# Patient Record
Sex: FEMALE | Race: WHITE | Employment: OTHER | URBAN - METROPOLITAN AREA
[De-identification: names, ages, dates, MRNs, and addresses within clinical notes are randomized per-mention and may not be internally consistent; named-entity substitution may affect disease eponyms.]

---

## 2018-02-26 PROBLEM — E11.9 TYPE 2 DIABETES MELLITUS WITHOUT COMPLICATION, WITHOUT LONG-TERM CURRENT USE OF INSULIN (HCC): Status: ACTIVE | Noted: 2018-02-26

## 2018-06-06 ENCOUNTER — OFFICE VISIT (OUTPATIENT)
Dept: FAMILY MEDICINE CLINIC | Age: 63
End: 2018-06-06
Payer: COMMERCIAL

## 2018-06-06 VITALS
BODY MASS INDEX: 46.19 KG/M2 | WEIGHT: 251 LBS | TEMPERATURE: 97.1 F | SYSTOLIC BLOOD PRESSURE: 138 MMHG | HEIGHT: 62 IN | HEART RATE: 91 BPM | OXYGEN SATURATION: 96 % | DIASTOLIC BLOOD PRESSURE: 80 MMHG | RESPIRATION RATE: 18 BRPM

## 2018-06-06 DIAGNOSIS — I10 ESSENTIAL HYPERTENSION: ICD-10-CM

## 2018-06-06 DIAGNOSIS — Z23 NEED FOR VACCINATION: ICD-10-CM

## 2018-06-06 DIAGNOSIS — E07.9 THYROID DISEASE: ICD-10-CM

## 2018-06-06 DIAGNOSIS — E11.9 TYPE 2 DIABETES MELLITUS WITHOUT COMPLICATION, WITHOUT LONG-TERM CURRENT USE OF INSULIN (HCC): Primary | ICD-10-CM

## 2018-06-06 DIAGNOSIS — M19.90 ARTHRITIS: ICD-10-CM

## 2018-06-06 LAB — HBA1C MFR BLD: 6.6 %

## 2018-06-06 PROCEDURE — 3044F HG A1C LEVEL LT 7.0%: CPT | Performed by: FAMILY MEDICINE

## 2018-06-06 PROCEDURE — 99213 OFFICE O/P EST LOW 20 MIN: CPT | Performed by: FAMILY MEDICINE

## 2018-06-06 PROCEDURE — G8417 CALC BMI ABV UP PARAM F/U: HCPCS | Performed by: FAMILY MEDICINE

## 2018-06-06 PROCEDURE — 83036 HEMOGLOBIN GLYCOSYLATED A1C: CPT | Performed by: FAMILY MEDICINE

## 2018-06-06 PROCEDURE — 90670 PCV13 VACCINE IM: CPT | Performed by: FAMILY MEDICINE

## 2018-06-06 PROCEDURE — 90715 TDAP VACCINE 7 YRS/> IM: CPT | Performed by: FAMILY MEDICINE

## 2018-06-06 PROCEDURE — G8427 DOCREV CUR MEDS BY ELIG CLIN: HCPCS | Performed by: FAMILY MEDICINE

## 2018-06-06 PROCEDURE — 2022F DILAT RTA XM EVC RTNOPTHY: CPT | Performed by: FAMILY MEDICINE

## 2018-06-06 PROCEDURE — G8400 PT W/DXA NO RESULTS DOC: HCPCS | Performed by: FAMILY MEDICINE

## 2018-06-06 PROCEDURE — 90472 IMMUNIZATION ADMIN EACH ADD: CPT | Performed by: FAMILY MEDICINE

## 2018-06-06 PROCEDURE — 1036F TOBACCO NON-USER: CPT | Performed by: FAMILY MEDICINE

## 2018-06-06 PROCEDURE — 1090F PRES/ABSN URINE INCON ASSESS: CPT | Performed by: FAMILY MEDICINE

## 2018-06-06 PROCEDURE — 1123F ACP DISCUSS/DSCN MKR DOCD: CPT | Performed by: FAMILY MEDICINE

## 2018-06-06 PROCEDURE — 90471 IMMUNIZATION ADMIN: CPT | Performed by: FAMILY MEDICINE

## 2018-06-06 PROCEDURE — 3017F COLORECTAL CA SCREEN DOC REV: CPT | Performed by: FAMILY MEDICINE

## 2018-06-06 PROCEDURE — 4040F PNEUMOC VAC/ADMIN/RCVD: CPT | Performed by: FAMILY MEDICINE

## 2018-06-06 PROCEDURE — 3014F SCREEN MAMMO DOC REV: CPT | Performed by: FAMILY MEDICINE

## 2018-06-27 ENCOUNTER — OFFICE VISIT (OUTPATIENT)
Dept: FAMILY MEDICINE CLINIC | Age: 63
End: 2018-06-27
Payer: COMMERCIAL

## 2018-06-27 VITALS
BODY MASS INDEX: 46.38 KG/M2 | DIASTOLIC BLOOD PRESSURE: 70 MMHG | HEART RATE: 96 BPM | TEMPERATURE: 98.5 F | RESPIRATION RATE: 16 BRPM | SYSTOLIC BLOOD PRESSURE: 140 MMHG | WEIGHT: 253.6 LBS | OXYGEN SATURATION: 95 %

## 2018-06-27 DIAGNOSIS — E11.9 TYPE 2 DIABETES MELLITUS WITHOUT COMPLICATION, WITHOUT LONG-TERM CURRENT USE OF INSULIN (HCC): Primary | ICD-10-CM

## 2018-06-27 DIAGNOSIS — H92.02 LEFT EAR PAIN: ICD-10-CM

## 2018-06-27 DIAGNOSIS — I10 ESSENTIAL HYPERTENSION: ICD-10-CM

## 2018-06-27 LAB
CREATININE URINE POCT: NORMAL
MICROALBUMIN/CREAT 24H UR: NORMAL MG/G{CREAT}
MICROALBUMIN/CREAT UR-RTO: NORMAL

## 2018-06-27 PROCEDURE — 1036F TOBACCO NON-USER: CPT | Performed by: FAMILY MEDICINE

## 2018-06-27 PROCEDURE — 4040F PNEUMOC VAC/ADMIN/RCVD: CPT | Performed by: FAMILY MEDICINE

## 2018-06-27 PROCEDURE — 3014F SCREEN MAMMO DOC REV: CPT | Performed by: FAMILY MEDICINE

## 2018-06-27 PROCEDURE — G8400 PT W/DXA NO RESULTS DOC: HCPCS | Performed by: FAMILY MEDICINE

## 2018-06-27 PROCEDURE — 1090F PRES/ABSN URINE INCON ASSESS: CPT | Performed by: FAMILY MEDICINE

## 2018-06-27 PROCEDURE — 2022F DILAT RTA XM EVC RTNOPTHY: CPT | Performed by: FAMILY MEDICINE

## 2018-06-27 PROCEDURE — 3044F HG A1C LEVEL LT 7.0%: CPT | Performed by: FAMILY MEDICINE

## 2018-06-27 PROCEDURE — 99213 OFFICE O/P EST LOW 20 MIN: CPT | Performed by: FAMILY MEDICINE

## 2018-06-27 PROCEDURE — G8417 CALC BMI ABV UP PARAM F/U: HCPCS | Performed by: FAMILY MEDICINE

## 2018-06-27 PROCEDURE — G8427 DOCREV CUR MEDS BY ELIG CLIN: HCPCS | Performed by: FAMILY MEDICINE

## 2018-06-27 PROCEDURE — 82044 UR ALBUMIN SEMIQUANTITATIVE: CPT | Performed by: FAMILY MEDICINE

## 2018-06-27 PROCEDURE — 3017F COLORECTAL CA SCREEN DOC REV: CPT | Performed by: FAMILY MEDICINE

## 2018-06-27 PROCEDURE — 1123F ACP DISCUSS/DSCN MKR DOCD: CPT | Performed by: FAMILY MEDICINE

## 2018-07-25 ENCOUNTER — TELEPHONE (OUTPATIENT)
Dept: FAMILY MEDICINE CLINIC | Age: 63
End: 2018-07-25

## 2018-07-25 DIAGNOSIS — E07.9 THYROID DISEASE: ICD-10-CM

## 2018-07-25 RX ORDER — LEVOTHYROXINE SODIUM 50 MCG
50 TABLET ORAL DAILY
Qty: 90 TABLET | Refills: 0 | Status: SHIPPED | OUTPATIENT
Start: 2018-07-25 | End: 2018-10-31 | Stop reason: SDUPTHER

## 2018-07-27 RX ORDER — IRBESARTAN AND HYDROCHLOROTHIAZIDE 150; 12.5 MG/1; MG/1
1 TABLET, FILM COATED ORAL DAILY
Qty: 90 TABLET | Refills: 1 | Status: SHIPPED | OUTPATIENT
Start: 2018-07-27 | End: 2019-01-15 | Stop reason: SDUPTHER

## 2018-10-31 DIAGNOSIS — E07.9 THYROID DISEASE: ICD-10-CM

## 2018-10-31 RX ORDER — LEVOTHYROXINE SODIUM 50 MCG
50 TABLET ORAL DAILY
Qty: 90 TABLET | Refills: 0 | Status: SHIPPED | OUTPATIENT
Start: 2018-10-31 | End: 2019-02-25 | Stop reason: SDUPTHER

## 2018-12-11 ENCOUNTER — OFFICE VISIT (OUTPATIENT)
Dept: FAMILY MEDICINE CLINIC | Age: 63
End: 2018-12-11
Payer: MEDICARE

## 2018-12-11 VITALS
TEMPERATURE: 97.7 F | HEIGHT: 62 IN | OXYGEN SATURATION: 97 % | BODY MASS INDEX: 47.48 KG/M2 | DIASTOLIC BLOOD PRESSURE: 74 MMHG | WEIGHT: 258 LBS | SYSTOLIC BLOOD PRESSURE: 138 MMHG | HEART RATE: 94 BPM | RESPIRATION RATE: 16 BRPM

## 2018-12-11 DIAGNOSIS — E07.9 THYROID DISEASE: ICD-10-CM

## 2018-12-11 DIAGNOSIS — Z23 NEED FOR PROPHYLACTIC VACCINATION AND INOCULATION AGAINST VARICELLA: ICD-10-CM

## 2018-12-11 DIAGNOSIS — Z00.00 WELCOME TO MEDICARE PREVENTIVE VISIT: Primary | ICD-10-CM

## 2018-12-11 DIAGNOSIS — I10 ESSENTIAL HYPERTENSION: ICD-10-CM

## 2018-12-11 DIAGNOSIS — E11.9 TYPE 2 DIABETES MELLITUS WITHOUT COMPLICATION, WITHOUT LONG-TERM CURRENT USE OF INSULIN (HCC): ICD-10-CM

## 2018-12-11 DIAGNOSIS — Z00.00 ROUTINE GENERAL MEDICAL EXAMINATION AT A HEALTH CARE FACILITY: ICD-10-CM

## 2018-12-11 DIAGNOSIS — R01.1 HEART MURMUR: ICD-10-CM

## 2018-12-11 DIAGNOSIS — E66.01 CLASS 3 SEVERE OBESITY DUE TO EXCESS CALORIES WITHOUT SERIOUS COMORBIDITY WITH BODY MASS INDEX (BMI) OF 45.0 TO 49.9 IN ADULT (HCC): ICD-10-CM

## 2018-12-11 LAB — HBA1C MFR BLD: 6.9 %

## 2018-12-11 PROCEDURE — 83036 HEMOGLOBIN GLYCOSYLATED A1C: CPT | Performed by: FAMILY MEDICINE

## 2018-12-11 PROCEDURE — G0402 INITIAL PREVENTIVE EXAM: HCPCS | Performed by: FAMILY MEDICINE

## 2018-12-11 PROCEDURE — 3044F HG A1C LEVEL LT 7.0%: CPT | Performed by: FAMILY MEDICINE

## 2018-12-11 PROCEDURE — 4040F PNEUMOC VAC/ADMIN/RCVD: CPT | Performed by: FAMILY MEDICINE

## 2018-12-11 PROCEDURE — G0403 EKG FOR INITIAL PREVENT EXAM: HCPCS | Performed by: FAMILY MEDICINE

## 2018-12-11 ASSESSMENT — ANXIETY QUESTIONNAIRES: GAD7 TOTAL SCORE: 0

## 2018-12-11 ASSESSMENT — PATIENT HEALTH QUESTIONNAIRE - PHQ9
SUM OF ALL RESPONSES TO PHQ QUESTIONS 1-9: 0
SUM OF ALL RESPONSES TO PHQ QUESTIONS 1-9: 0

## 2018-12-11 ASSESSMENT — LIFESTYLE VARIABLES: HOW OFTEN DO YOU HAVE A DRINK CONTAINING ALCOHOL: 0

## 2018-12-11 NOTE — PATIENT INSTRUCTIONS
Personalized Preventive Plan for Bakari Richter - 12/11/2018  Medicare offers a range of preventive health benefits. Some of the tests and screenings are paid in full while other may be subject to a deductible, co-insurance, and/or copay. Some of these benefits include a comprehensive review of your medical history including lifestyle, illnesses that may run in your family, and various assessments and screenings as appropriate. After reviewing your medical record and screening and assessments performed today your provider may have ordered immunizations, labs, imaging, and/or referrals for you. A list of these orders (if applicable) as well as your Preventive Care list are included within your After Visit Summary for your review. Other Preventive Recommendations:    · A preventive eye exam performed by an eye specialist is recommended every 1-2 years to screen for glaucoma; cataracts, macular degeneration, and other eye disorders. · A preventive dental visit is recommended every 6 months. · Try to get at least 150 minutes of exercise per week or 10,000 steps per day on a pedometer . · Order or download the FREE \"Exercise & Physical Activity: Your Everyday Guide\" from [employer]. Call 8-549.656.7980 or search [employer] online. · You need 2091-3968 mg of calcium and 9974-8873 IU of vitamin D per day. It is possible to meet your calcium requirement with diet alone, but a vitamin D supplement is usually necessary to meet this goal.  · When exposed to the sun, use a sunscreen that protects against both UVA and UVB radiation with an SPF of 30 or greater. Reapply every 2 to 3 hours or after sweating, drying off with a towel, or swimming. · Always wear a seat belt when traveling in a car. Always wear a helmet when riding a bicycle or motorcycle.

## 2018-12-25 PROBLEM — E66.813 CLASS 3 SEVERE OBESITY DUE TO EXCESS CALORIES WITHOUT SERIOUS COMORBIDITY WITH BODY MASS INDEX (BMI) OF 45.0 TO 49.9 IN ADULT: Status: ACTIVE | Noted: 2018-12-25

## 2018-12-25 PROBLEM — E66.01 CLASS 3 SEVERE OBESITY DUE TO EXCESS CALORIES WITHOUT SERIOUS COMORBIDITY WITH BODY MASS INDEX (BMI) OF 45.0 TO 49.9 IN ADULT (HCC): Status: ACTIVE | Noted: 2018-12-25

## 2019-01-04 ENCOUNTER — HOSPITAL ENCOUNTER (OUTPATIENT)
Dept: CARDIOLOGY | Age: 64
Discharge: HOME | End: 2019-01-04
Payer: MEDICARE

## 2019-01-04 DIAGNOSIS — R01.1 HEART MURMUR: ICD-10-CM

## 2019-01-04 LAB
LV EF: 65 %
LVEF MODALITY: NORMAL

## 2019-01-04 PROCEDURE — 93306 TTE W/DOPPLER COMPLETE: CPT

## 2019-01-04 PROCEDURE — 6360000004 HC RX CONTRAST MEDICATION: Performed by: INTERNAL MEDICINE

## 2019-01-04 PROCEDURE — 2580000003 HC RX 258: Performed by: INTERNAL MEDICINE

## 2019-01-04 RX ORDER — SODIUM CHLORIDE 0.9 % (FLUSH) 0.9 %
10 SYRINGE (ML) INJECTION PRN
Status: DISCONTINUED | OUTPATIENT
Start: 2019-01-04 | End: 2019-01-05 | Stop reason: HOSPADM

## 2019-01-04 RX ADMIN — PERFLUTREN 1.1 MG: 6.52 INJECTION, SUSPENSION INTRAVENOUS at 09:02

## 2019-01-04 RX ADMIN — Medication 10 ML: at 09:02

## 2019-01-15 RX ORDER — IRBESARTAN AND HYDROCHLOROTHIAZIDE 150; 12.5 MG/1; MG/1
1 TABLET, FILM COATED ORAL DAILY
Qty: 90 TABLET | Refills: 1 | Status: SHIPPED | OUTPATIENT
Start: 2019-01-15 | End: 2019-02-05 | Stop reason: ALTCHOICE

## 2019-02-04 ENCOUNTER — TELEPHONE (OUTPATIENT)
Dept: FAMILY MEDICINE CLINIC | Age: 64
End: 2019-02-04

## 2019-02-05 RX ORDER — OLMESARTAN MEDOXOMIL AND HYDROCHLOROTHIAZIDE 40/12.5 40; 12.5 MG/1; MG/1
1 TABLET ORAL DAILY
Qty: 90 TABLET | Refills: 1 | Status: SHIPPED | OUTPATIENT
Start: 2019-02-05 | End: 2019-05-17 | Stop reason: SDUPTHER

## 2019-02-06 ENCOUNTER — TELEPHONE (OUTPATIENT)
Dept: FAMILY MEDICINE CLINIC | Age: 64
End: 2019-02-06

## 2019-02-06 DIAGNOSIS — I35.0 MODERATE AORTIC STENOSIS: Primary | ICD-10-CM

## 2019-02-22 ENCOUNTER — TELEPHONE (OUTPATIENT)
Dept: ADMINISTRATIVE | Age: 64
End: 2019-02-22

## 2019-02-25 DIAGNOSIS — E07.9 THYROID DISEASE: ICD-10-CM

## 2019-02-25 RX ORDER — LEVOTHYROXINE SODIUM 50 MCG
50 TABLET ORAL DAILY
Qty: 90 TABLET | Refills: 1 | Status: SHIPPED | OUTPATIENT
Start: 2019-02-25 | End: 2019-05-17 | Stop reason: SDUPTHER

## 2019-02-27 ENCOUNTER — TELEPHONE (OUTPATIENT)
Dept: CARDIOLOGY CLINIC | Age: 64
End: 2019-02-27

## 2019-04-04 ENCOUNTER — TELEPHONE (OUTPATIENT)
Dept: FAMILY MEDICINE CLINIC | Age: 64
End: 2019-04-04

## 2019-04-04 NOTE — TELEPHONE ENCOUNTER
L ankle swollen on inside, warm to touch x 2 days. Doesn't think she did anything.  Gave her appt tomorrow with Delphine Hauser

## 2019-04-05 ENCOUNTER — OFFICE VISIT (OUTPATIENT)
Dept: FAMILY MEDICINE CLINIC | Age: 64
End: 2019-04-05
Payer: MEDICARE

## 2019-04-05 VITALS
WEIGHT: 246.8 LBS | TEMPERATURE: 97.3 F | DIASTOLIC BLOOD PRESSURE: 70 MMHG | SYSTOLIC BLOOD PRESSURE: 138 MMHG | HEART RATE: 94 BPM | OXYGEN SATURATION: 96 % | BODY MASS INDEX: 45.14 KG/M2 | RESPIRATION RATE: 16 BRPM

## 2019-04-05 DIAGNOSIS — E11.9 TYPE 2 DIABETES MELLITUS WITHOUT COMPLICATION, WITHOUT LONG-TERM CURRENT USE OF INSULIN (HCC): ICD-10-CM

## 2019-04-05 DIAGNOSIS — M25.572 ACUTE LEFT ANKLE PAIN: Primary | ICD-10-CM

## 2019-04-05 LAB — HBA1C MFR BLD: 6.5 %

## 2019-04-05 PROCEDURE — G8417 CALC BMI ABV UP PARAM F/U: HCPCS | Performed by: PHYSICIAN ASSISTANT

## 2019-04-05 PROCEDURE — 4040F PNEUMOC VAC/ADMIN/RCVD: CPT | Performed by: PHYSICIAN ASSISTANT

## 2019-04-05 PROCEDURE — 99213 OFFICE O/P EST LOW 20 MIN: CPT | Performed by: PHYSICIAN ASSISTANT

## 2019-04-05 PROCEDURE — 1090F PRES/ABSN URINE INCON ASSESS: CPT | Performed by: PHYSICIAN ASSISTANT

## 2019-04-05 PROCEDURE — 2022F DILAT RTA XM EVC RTNOPTHY: CPT | Performed by: PHYSICIAN ASSISTANT

## 2019-04-05 PROCEDURE — 83036 HEMOGLOBIN GLYCOSYLATED A1C: CPT | Performed by: PHYSICIAN ASSISTANT

## 2019-04-05 PROCEDURE — 1123F ACP DISCUSS/DSCN MKR DOCD: CPT | Performed by: PHYSICIAN ASSISTANT

## 2019-04-05 PROCEDURE — 1036F TOBACCO NON-USER: CPT | Performed by: PHYSICIAN ASSISTANT

## 2019-04-05 PROCEDURE — 3044F HG A1C LEVEL LT 7.0%: CPT | Performed by: PHYSICIAN ASSISTANT

## 2019-04-05 PROCEDURE — 3017F COLORECTAL CA SCREEN DOC REV: CPT | Performed by: PHYSICIAN ASSISTANT

## 2019-04-05 PROCEDURE — G8427 DOCREV CUR MEDS BY ELIG CLIN: HCPCS | Performed by: PHYSICIAN ASSISTANT

## 2019-04-05 PROCEDURE — G8400 PT W/DXA NO RESULTS DOC: HCPCS | Performed by: PHYSICIAN ASSISTANT

## 2019-04-05 ASSESSMENT — PATIENT HEALTH QUESTIONNAIRE - PHQ9
SUM OF ALL RESPONSES TO PHQ QUESTIONS 1-9: 0
1. LITTLE INTEREST OR PLEASURE IN DOING THINGS: 0
2. FEELING DOWN, DEPRESSED OR HOPELESS: 0
SUM OF ALL RESPONSES TO PHQ QUESTIONS 1-9: 0
SUM OF ALL RESPONSES TO PHQ9 QUESTIONS 1 & 2: 0

## 2019-04-05 NOTE — PROGRESS NOTES
Chief Complaint:   Ankle Problem (red, swollen, and painful when walking x yesterday-Better today)    History of Present Illness   Source of history provided by:  patient. Randy Fry is a 75 y.o. old female who has a past medical history of:   Past Medical History:   Diagnosis Date    Hypertension     Obesity     Thyroid disease    presents for complaint of redness to the left ankle yesterday. She states that the ankle was slight red and swollen and was painful when she was walking. She states that she elevated it a lot yesterday and today symptoms have resolved. She has been doing a lot of exercising at the gym and doing water aerobics. Denies any redness, swelling, or pain today. Denies leg pain, calf pain, or calf redness. Denies any fever, chills, HA, recent illness, myalgias, vomiting, or lethargy. Patient is requesting A1c checked today due to recent changes in diet and exercise. Last A1c was 6.9. Today it was 6.5.     ROS    Unless otherwise stated in this report or unable to obtain because of the patient's clinical or mental status as evidenced by the medical record, this patients's positive and negative responses for Review of Systems, constitutional, psych, eyes, ENT, cardiovascular, respiratory, gastrointestinal, neurological, genitourinary, musculoskeletal, integument systems and systems related to the presenting problem are either stated in the preceding or were not pertinent or were negative for the symptoms and/or complaints related to the medical problem. Past Surgical History:  has a past surgical history that includes  section; Colonoscopy (); Coronary artery bypass graft; and Spine surgery. Social History:  reports that she has never smoked. She has never used smokeless tobacco. She reports that she does not drink alcohol or use drugs.   Family History: family history includes Cancer in her father; Diabetes in her mother; Early Death (age of onset: 58) in her

## 2019-04-12 ENCOUNTER — TELEPHONE (OUTPATIENT)
Dept: FAMILY MEDICINE CLINIC | Age: 64
End: 2019-04-12

## 2019-04-12 NOTE — TELEPHONE ENCOUNTER
Received fax from [facility]-Irbesartan/HCT is on back order and need to switch medication.   This was already addressed and switched on 02/05/19

## 2019-04-22 NOTE — TELEPHONE ENCOUNTER
Refill Irbesartan /12.5 mg, [facility] 90 day supply    Spoke with Patient and she is going to switch to the Olmesartan HCTZ and will take her blood pressures and call with readings once she starts taking the new medication. DLB agrees with treatment plan.

## 2019-05-07 ENCOUNTER — TELEPHONE (OUTPATIENT)
Dept: FAMILY MEDICINE CLINIC | Age: 64
End: 2019-05-07

## 2019-05-07 DIAGNOSIS — M25.572 CHRONIC PAIN OF LEFT ANKLE: Primary | ICD-10-CM

## 2019-05-07 DIAGNOSIS — G89.29 CHRONIC PAIN OF LEFT ANKLE: Primary | ICD-10-CM

## 2019-05-07 NOTE — TELEPHONE ENCOUNTER
Pt called and wanted to know if you can order an Xray for her Left ankle. Pt is still having some pain and swelling off an on x 1 yr. Pt is coming in on 5-17-19. Fina's ph# [phone].

## 2019-05-17 ENCOUNTER — OFFICE VISIT (OUTPATIENT)
Dept: FAMILY MEDICINE CLINIC | Age: 64
End: 2019-05-17
Payer: MEDICARE

## 2019-05-17 VITALS
WEIGHT: 245.2 LBS | OXYGEN SATURATION: 97 % | RESPIRATION RATE: 16 BRPM | DIASTOLIC BLOOD PRESSURE: 60 MMHG | SYSTOLIC BLOOD PRESSURE: 130 MMHG | HEART RATE: 105 BPM | TEMPERATURE: 97.9 F | BODY MASS INDEX: 44.85 KG/M2

## 2019-05-17 DIAGNOSIS — E66.01 CLASS 3 SEVERE OBESITY DUE TO EXCESS CALORIES WITHOUT SERIOUS COMORBIDITY WITH BODY MASS INDEX (BMI) OF 45.0 TO 49.9 IN ADULT (HCC): ICD-10-CM

## 2019-05-17 DIAGNOSIS — I10 ESSENTIAL HYPERTENSION: Primary | ICD-10-CM

## 2019-05-17 DIAGNOSIS — E11.9 TYPE 2 DIABETES MELLITUS WITHOUT COMPLICATION, WITHOUT LONG-TERM CURRENT USE OF INSULIN (HCC): ICD-10-CM

## 2019-05-17 DIAGNOSIS — E07.9 THYROID DISEASE: ICD-10-CM

## 2019-05-17 DIAGNOSIS — M25.572 CHRONIC PAIN OF LEFT ANKLE: ICD-10-CM

## 2019-05-17 DIAGNOSIS — M25.572 ACUTE LEFT ANKLE PAIN: ICD-10-CM

## 2019-05-17 DIAGNOSIS — G89.29 CHRONIC PAIN OF LEFT ANKLE: ICD-10-CM

## 2019-05-17 PROCEDURE — 99214 OFFICE O/P EST MOD 30 MIN: CPT | Performed by: FAMILY MEDICINE

## 2019-05-17 PROCEDURE — G8400 PT W/DXA NO RESULTS DOC: HCPCS | Performed by: FAMILY MEDICINE

## 2019-05-17 PROCEDURE — 1123F ACP DISCUSS/DSCN MKR DOCD: CPT | Performed by: FAMILY MEDICINE

## 2019-05-17 PROCEDURE — 1036F TOBACCO NON-USER: CPT | Performed by: FAMILY MEDICINE

## 2019-05-17 PROCEDURE — 1090F PRES/ABSN URINE INCON ASSESS: CPT | Performed by: FAMILY MEDICINE

## 2019-05-17 PROCEDURE — 3044F HG A1C LEVEL LT 7.0%: CPT | Performed by: FAMILY MEDICINE

## 2019-05-17 PROCEDURE — 4040F PNEUMOC VAC/ADMIN/RCVD: CPT | Performed by: FAMILY MEDICINE

## 2019-05-17 PROCEDURE — 2022F DILAT RTA XM EVC RTNOPTHY: CPT | Performed by: FAMILY MEDICINE

## 2019-05-17 PROCEDURE — G8417 CALC BMI ABV UP PARAM F/U: HCPCS | Performed by: FAMILY MEDICINE

## 2019-05-17 PROCEDURE — G8427 DOCREV CUR MEDS BY ELIG CLIN: HCPCS | Performed by: FAMILY MEDICINE

## 2019-05-17 PROCEDURE — 3017F COLORECTAL CA SCREEN DOC REV: CPT | Performed by: FAMILY MEDICINE

## 2019-05-17 RX ORDER — OLMESARTAN MEDOXOMIL AND HYDROCHLOROTHIAZIDE 40/12.5 40; 12.5 MG/1; MG/1
1 TABLET ORAL DAILY
Qty: 90 TABLET | Refills: 1 | Status: SHIPPED | OUTPATIENT
Start: 2019-05-17 | End: 2019-08-14 | Stop reason: RX

## 2019-05-17 RX ORDER — LEVOTHYROXINE SODIUM 50 MCG
50 TABLET ORAL DAILY
Qty: 90 TABLET | Refills: 1 | Status: SHIPPED | OUTPATIENT
Start: 2019-05-17 | End: 2019-11-18 | Stop reason: SDUPTHER

## 2019-05-17 NOTE — PROGRESS NOTES
5/18/2019    Chief Complaint   Patient presents with    Ankle Pain     Swells off and on    Results              Patient Active Problem List    Diagnosis Date Noted    Acute left ankle pain 04/05/2019    Type 2 diabetes mellitus without complication, without long-term current use of insulin ([address] 02/26/2018    HTN (hypertension) 02/09/2015    Class 3 severe obesity due to excess calories without serious comorbidity with body mass index (BMI) of 45.0 to 49.9 in [facility]) 02/09/2015    Thyroid disease 02/09/2015     Subjective  Has appt next week and would like to address all issues today  Ankle xray reviewed and spurr on calcaneous and hindfoot  Painful when walking    1. Thyroid disease  Lab Results   Component Value Date    TSH 3.130 02/16/2018   no labs in over 1 year but states she will get    - SYNTHROID 50 MCG tablet; Take 1 tablet by mouth Daily  Dispense: 90 tablet; Refill: 1  - CBC Auto Differential; Future  - Lipid Panel; Future  - TSH without Reflex; Future    2. Essential hypertension  Vitals:    05/17/19 1609   BP: 130/60   Pulse:    Resp:    Temp:    SpO2:        - olmesartan-hydrochlorothiazide (BENICAR HCT) 40-12.5 MG per tablet; Take 1 tablet by mouth daily  Dispense: 90 tablet; Refill: 1  - Comprehensive Metabolic Panel; Future  - Lipid Panel; Future  - TSH without Reflex; Future    3. Acute left ankle pain    - Margoth Alamo MD, Orthopaedics, [place] ([facility])    4. Class 3 severe obesity due to excess calories without serious comorbidity with body mass index (BMI) of 45.0 to 49.9 in [facility])  Watching diet and going to join gym and see ortho    5. Type 2 diabetes mellitus without complication, without long-term current use of insulin ([address]  .         Goals      Blood Pressure < 140/90      Exercise 3x per week (30 min per time)      Reduce sugar intake to X grams per day         Goals      Review of Systems     Review Of Systems    General:  No weight change no malaise no fatigue no change in appetite no sleep disturbance nofever/chills no night sweats  Skin:                 no abnormal pigmentation, no rash, no scaling,noitching,no Masses,no  hair or nail changes  Eyes:               no blurring, no diplopia, no  eye pain noglaucoma no cataracts  ENT:                 no hearing loss,no  Tinnitus,no  Vertigo,no osebleed, no nasalcongestion, no rhinorrhea,  no sore throat no jaw pain no hoarseness no bleeding    Neck:    no node tenderness , not rigid, no masses   Respiratory:            no cough, no sputum,No coughing blood, no pleuritic , no chest pain, no dyspnea,  no wheezing  Cardiovascular:         no angina,No chest pain  No syncope, no pedal edema , no orthopnea, no PND, no palpitations, no claudication  Gastrointestinal  no nausea, no vomiting, no heartburn, no diarrhea, no constipation, no bloating,  no abdominal pain, no rectal pain, no bleeding no hemorrhoids, no hernia  Genitourinary:           no urinary urgency, no frequency, no dysuria, no nocturia, hesitancy, no  Incontinence, no bleeding, no stones  Musculoskeletal:        ++ arthritis,++ L foot pain  arthralgia, no myalgia,no  weakness,  no morning stiffness, no joint swelling  Neurologic:                no paralysis, no resis,no  paresthesia, no seizures,no  tremors,no headaches, no tumors , no stroke, no speechissues,  No incoordination, no head trauma, no memory loss/concentration  Hematologic:            no anemia, noabnormal bleeding/bruising, no fever, no chills,no night sweats, no wollen glands, no unexplained weight loss  Endocrine:        no heat or cold intolerance and no polyphagia, polydipsia,  or polyuria  Psych:   No depression no anxiety, no suicidal or homicidal thoughts, no irritability, no decreased energy, no trouble falling asleep, no early awakening , no trouble concentrating             Objective:    Family History   Problem Relation Age of Onset    Early Death Mother 58    Diabetes Mother     Heart visit. Wt Readings from Last 3 Encounters:   05/17/19 245 lb 3.2 oz (111.2 kg)   04/05/19 246 lb 12.8 oz (111.9 kg)   12/11/18 258 lb (117 kg)         Vitals:    05/17/19 1609   BP: 130/60   Pulse:    Resp:    Temp:    SpO2:          Physical Exam    Physical Exam:    General: Noobesity, no cachexia  Skin:    Warm and dry. Not pale, not flushed , no lesions, No rash , no bruises  HEENT:   PERRLA, EOMI. Conjunctiva clear, no  Drainage, no jaundice, ext canals normal ,no cerumenimpaction,TM's christina, throat no injected, no cobblestoning, no drainage , tonsils normal no exudates  Neck:    Supple,No JVD, No thyromegaly, No carotid bruit, no adenopathy  Cardiac:   PMI Normal,RRR, No gallop , no  murmur. No S3, no S4, no abd aortic bruit, normal pulses, , no pedal edema  Lungs:   CTA, symmetrical,  No wheezes no rales, Normal percussion, no use of accessory muscles,   Abdomen:  Normal bowel sounds, abd soft, non-tender, no rebound no guarding,no hepatomegaly, no splenomegaly, no hernia, no ascites, normal palpation  Extremities:   No clubbing, no edema no cyanosis. Pedal pulses ok  M/S:  Back normal,no kyphosis, no cva tenderness,  Head and neck normal rom, shoulders normal strength , , hips normal , gait normal , no cane or walker , no motor deficits, no clubbing, normal nails  Neurological: A & O x 3, Moves all extremities,  No gross neuro deficits ,normal DTR, normal memory, normal judgement/insight, normal affect                                 Assessment:    1. Thyroid disease  Stable, cont meds recheck 6 mos    - SYNTHROID 50 MCG tablet; Take 1 tablet by mouth Daily  Dispense: 90 tablet; Refill: 1  - CBC Auto Differential; Future  - Lipid Panel; Future  - TSH without Reflex; Future    2. Essential hypertension  Stable, cont meds recheck 6 mos    - olmesartan-hydrochlorothiazide (BENICAR HCT) 40-12.5 MG per tablet; Take 1 tablet by mouth daily  Dispense: 90 tablet;  Refill: 1  - Comprehensive Metabolic Panel; Future  - Lipid Panel; Future  - TSH without Reflex; Future    3. Acute left ankle pain  Stable, cont meds recheck 6 mos    - Jorge Cisneros MD, Orthopaedics, [place] [facility]    4. Class 3 severe obesity due to excess calories without serious comorbidity with body mass index (BMI) of 45.0 to 49.9 in adult (HCC)  Stable, cont meds recheck 6 mos      5. Type 2 diabetes mellitus without complication, without long-term current use of insulin ([facility])  Stable, cont meds recheck 6 mos        Quality & Risk Score Accuracy    Visit Dx:  E66.01, Z68.41 - Morbid obesity with BMI of 40.0-44.9, adult ([facility] [address]  The current BMI is 44.85 kg/m2 as of 05/18/19 07:03 EDT  Assessment and plan: Worsening based upon symptoms and exam. Treatment plan as follows: going to weight watchers and going to  Gym now  Follow up in 3 months. Last edited 05/18/19 07:04 EDT by Mina Monsivais MD                     4. Reviewed labs    5. No follow-ups on file.            Mina Monsivais M.D.    5/18/2019

## 2019-08-12 ENCOUNTER — TELEPHONE (OUTPATIENT)
Dept: FAMILY MEDICINE CLINIC | Age: 64
End: 2019-08-12

## 2019-08-14 RX ORDER — IRBESARTAN AND HYDROCHLOROTHIAZIDE 150; 12.5 MG/1; MG/1
1 TABLET, FILM COATED ORAL DAILY
Qty: 90 TABLET | Refills: 1 | Status: SHIPPED
Start: 2019-08-14 | End: 2020-02-11 | Stop reason: SDUPTHER

## 2019-11-18 DIAGNOSIS — E07.9 THYROID DISEASE: ICD-10-CM

## 2019-11-18 RX ORDER — LEVOTHYROXINE SODIUM 50 MCG
50 TABLET ORAL DAILY
Qty: 90 TABLET | Refills: 1 | Status: SHIPPED | OUTPATIENT
Start: 2019-11-18 | End: 2019-11-20 | Stop reason: SDUPTHER

## 2019-11-20 DIAGNOSIS — E07.9 THYROID DISEASE: ICD-10-CM

## 2019-11-20 RX ORDER — LEVOTHYROXINE SODIUM 50 MCG
50 TABLET ORAL DAILY
Qty: 90 TABLET | Refills: 1 | Status: SHIPPED
Start: 2019-11-20 | End: 2020-02-11 | Stop reason: SDUPTHER

## 2020-02-11 RX ORDER — LEVOTHYROXINE SODIUM 50 MCG
50 TABLET ORAL DAILY
Qty: 90 TABLET | Refills: 1 | Status: SHIPPED
Start: 2020-02-11 | End: 2020-08-05 | Stop reason: SDUPTHER

## 2020-02-11 RX ORDER — IRBESARTAN AND HYDROCHLOROTHIAZIDE 150; 12.5 MG/1; MG/1
1 TABLET, FILM COATED ORAL DAILY
Qty: 90 TABLET | Refills: 1 | Status: SHIPPED
Start: 2020-02-11 | End: 2020-08-05 | Stop reason: SDUPTHER

## 2020-07-28 RX ORDER — IRBESARTAN AND HYDROCHLOROTHIAZIDE 150; 12.5 MG/1; MG/1
TABLET, FILM COATED ORAL
Qty: 90 TABLET | Refills: 1 | OUTPATIENT
Start: 2020-07-28

## 2020-07-28 RX ORDER — LEVOTHYROXINE SODIUM 50 MCG
TABLET ORAL
Qty: 90 TABLET | Refills: 1 | OUTPATIENT
Start: 2020-07-28

## 2020-08-05 RX ORDER — IRBESARTAN AND HYDROCHLOROTHIAZIDE 150; 12.5 MG/1; MG/1
1 TABLET, FILM COATED ORAL DAILY
Qty: 90 TABLET | Refills: 0 | Status: SHIPPED
Start: 2020-08-05 | End: 2020-11-09 | Stop reason: SDUPTHER

## 2020-08-05 RX ORDER — LEVOTHYROXINE SODIUM 50 MCG
50 TABLET ORAL DAILY
Qty: 90 TABLET | Refills: 0 | Status: SHIPPED
Start: 2020-08-05 | End: 2020-11-17 | Stop reason: SDUPTHER

## 2020-11-09 RX ORDER — IRBESARTAN AND HYDROCHLOROTHIAZIDE 150; 12.5 MG/1; MG/1
1 TABLET, FILM COATED ORAL DAILY
Qty: 90 TABLET | Refills: 0 | Status: SHIPPED
Start: 2020-11-09 | End: 2021-02-05 | Stop reason: SDUPTHER

## 2020-11-17 RX ORDER — LEVOTHYROXINE SODIUM 50 MCG
50 TABLET ORAL DAILY
Qty: 90 TABLET | Refills: 0 | Status: SHIPPED
Start: 2020-11-17 | End: 2021-02-05 | Stop reason: SDUPTHER

## 2020-12-07 ENCOUNTER — OFFICE VISIT (OUTPATIENT)
Dept: FAMILY MEDICINE CLINIC | Age: 65
End: 2020-12-07
Payer: MEDICARE

## 2020-12-07 DIAGNOSIS — E11.9 TYPE 2 DIABETES MELLITUS WITHOUT COMPLICATION, WITHOUT LONG-TERM CURRENT USE OF INSULIN (HCC): ICD-10-CM

## 2020-12-07 DIAGNOSIS — E07.9 THYROID DISEASE: ICD-10-CM

## 2020-12-07 DIAGNOSIS — I10 ESSENTIAL HYPERTENSION: ICD-10-CM

## 2020-12-07 PROBLEM — I35.0 AORTIC STENOSIS, MODERATE: Status: ACTIVE | Noted: 2020-12-07

## 2020-12-07 LAB
BASOPHILS ABSOLUTE: 0.07 E9/L (ref 0–0.2)
BASOPHILS RELATIVE PERCENT: 1 % (ref 0–2)
EOSINOPHILS ABSOLUTE: 0.05 E9/L (ref 0.05–0.5)
EOSINOPHILS RELATIVE PERCENT: 0.7 % (ref 0–6)
HBA1C MFR BLD: 6.8 % (ref 4–5.6)
HCT VFR BLD CALC: 47 % (ref 34–48)
HEMOGLOBIN: 15.6 G/DL (ref 11.5–15.5)
IMMATURE GRANULOCYTES #: 0.02 E9/L
IMMATURE GRANULOCYTES %: 0.3 % (ref 0–5)
LYMPHOCYTES ABSOLUTE: 1.38 E9/L (ref 1.5–4)
LYMPHOCYTES RELATIVE PERCENT: 19.7 % (ref 20–42)
MCH RBC QN AUTO: 29.4 PG (ref 26–35)
MCHC RBC AUTO-ENTMCNC: 33.2 % (ref 32–34.5)
MCV RBC AUTO: 88.7 FL (ref 80–99.9)
MONOCYTES ABSOLUTE: 0.45 E9/L (ref 0.1–0.95)
MONOCYTES RELATIVE PERCENT: 6.4 % (ref 2–12)
NEUTROPHILS ABSOLUTE: 5.04 E9/L (ref 1.8–7.3)
NEUTROPHILS RELATIVE PERCENT: 71.9 % (ref 43–80)
PDW BLD-RTO: 12.7 FL (ref 11.5–15)
PLATELET # BLD: 249 E9/L (ref 130–450)
PMV BLD AUTO: 11.5 FL (ref 7–12)
RBC # BLD: 5.3 E12/L (ref 3.5–5.5)
WBC # BLD: 7 E9/L (ref 4.5–11.5)

## 2020-12-07 PROCEDURE — 1036F TOBACCO NON-USER: CPT | Performed by: FAMILY MEDICINE

## 2020-12-07 PROCEDURE — G0009 ADMIN PNEUMOCOCCAL VACCINE: HCPCS | Performed by: FAMILY MEDICINE

## 2020-12-07 PROCEDURE — 90732 PPSV23 VACC 2 YRS+ SUBQ/IM: CPT | Performed by: FAMILY MEDICINE

## 2020-12-07 PROCEDURE — 4040F PNEUMOC VAC/ADMIN/RCVD: CPT | Performed by: FAMILY MEDICINE

## 2020-12-07 PROCEDURE — 1090F PRES/ABSN URINE INCON ASSESS: CPT | Performed by: FAMILY MEDICINE

## 2020-12-07 PROCEDURE — G8417 CALC BMI ABV UP PARAM F/U: HCPCS | Performed by: FAMILY MEDICINE

## 2020-12-07 PROCEDURE — 1123F ACP DISCUSS/DSCN MKR DOCD: CPT | Performed by: FAMILY MEDICINE

## 2020-12-07 PROCEDURE — 2022F DILAT RTA XM EVC RTNOPTHY: CPT | Performed by: FAMILY MEDICINE

## 2020-12-07 PROCEDURE — 3017F COLORECTAL CA SCREEN DOC REV: CPT | Performed by: FAMILY MEDICINE

## 2020-12-07 PROCEDURE — G8427 DOCREV CUR MEDS BY ELIG CLIN: HCPCS | Performed by: FAMILY MEDICINE

## 2020-12-07 PROCEDURE — 99214 OFFICE O/P EST MOD 30 MIN: CPT | Performed by: FAMILY MEDICINE

## 2020-12-07 PROCEDURE — G8400 PT W/DXA NO RESULTS DOC: HCPCS | Performed by: FAMILY MEDICINE

## 2020-12-07 PROCEDURE — 3044F HG A1C LEVEL LT 7.0%: CPT | Performed by: FAMILY MEDICINE

## 2020-12-07 PROCEDURE — G8484 FLU IMMUNIZE NO ADMIN: HCPCS | Performed by: FAMILY MEDICINE

## 2020-12-07 ASSESSMENT — PATIENT HEALTH QUESTIONNAIRE - PHQ9
SUM OF ALL RESPONSES TO PHQ9 QUESTIONS 1 & 2: 0
SUM OF ALL RESPONSES TO PHQ QUESTIONS 1-9: 0
2. FEELING DOWN, DEPRESSED OR HOPELESS: 0
1. LITTLE INTEREST OR PLEASURE IN DOING THINGS: 0
SUM OF ALL RESPONSES TO PHQ QUESTIONS 1-9: 0
SUM OF ALL RESPONSES TO PHQ QUESTIONS 1-9: 0

## 2020-12-07 NOTE — PROGRESS NOTES
12/7/2020    Chief Complaint   Patient presents with    Hypertension     med kush chapman    Diabetes              Patient Active Problem List    Diagnosis Date Noted    Acute left ankle pain 04/05/2019    Type 2 diabetes mellitus without complication, without long-term current use of insulin ([facility] [address] 02/26/2018    HTN (hypertension) 02/09/2015    Class 3 severe obesity due to excess calories without serious comorbidity with body mass index (BMI) of 45.0 to 49.9 in adult [facility]) 02/09/2015    Thyroid disease 02/09/2015     Subjective    No blood work since 2018  No complaints  /76  Not checking it at home but will try to do this      1. Essential hypertension  Vitals:    12/07/20 0934   BP: 138/88   Pulse:    Resp:    Temp:    SpO2:          2. Thyroid disease  Lab Results   Component Value Date    TSH 3.130 02/16/2018         3.  Type 2 diabetes mellitus without complication, without long-term current use of insulin ([facility])  Lab Results   Component Value Date    LABA1C 6.5 04/05/2019     No results found for: EAG      The 10-year ASCVD risk score (Edilson Greco, et al., 2013) is: 15.7%    Values used to calculate the score:      Age: 77 years      Sex: Female      Is Non- : No      Diabetic: Yes      Tobacco smoker: No      Systolic Blood Pressure: 621 mmHg      Is BP treated: Yes      HDL Cholesterol: 52 mg/dL      Total Cholesterol: 180 mg/dL      Goals      Blood Pressure < 140/90      Exercise 3x per week (30 min per time)      Reduce sugar intake to X grams per day         Goals      Review of Systems     Review Of Systems    General:   No weight change no malaise no fatigue no change in appetite no sleep disturbance nofever/chills no night sweats  Skin:                no abnormal pigmentation, no rash, no scaling,no itching, no Masses,no  hair , no nail changes  Eyes:               no blurring, no diplopia, no  eye pain no glaucoma no cataracts ENT:                 no hearing loss,no  Tinnitus,no  Vertigo,no osebleed, no nasal congestion, no rhinorrhea, no sore throat , no jaw soraya. no hoarseness,  no bleeding    Neck:     no node tenderness , not rigid, no masses   Respiratory:           no cough, no sputum, No coughing blood, no pleuritic , no chest pain, no dyspnea,  no wheezing  Cardiovascular:         no angina, No chest pain  No syncope, no pedal edema , no orthopnea, no PND, no palpitations, no claudication  Gastrointestinal  no nausea, no vomiting, no heartburn, no diarrhea, no constipation, no bloating,  no abdominal pain, no rectal pain, no bleeding no hemorrhoids, no hernia   Genitourinary  no urinary urgency, no frequency, no dysuria, no nocturia, no hesitancy, no  Incontinence, no bleeding, no stones  Musculoskeletal:        no arthritis, no  arthralgia, no myalgia, no  weakness,  no morning stiffness, no joint swelling   Neurologic:                 no paralysis, no paresis, no  paresthesia, no seizures, no  tremors, no headaches, no tumors , no stroke, no speech issues,  No incoordination, no head trauma, no memory loss/concentration  Hematologic:              no anemia, no abnormal bleeding/bruising, no fever, no chills, no night sweats, no wollen glands, no unexplained weight loss  Endocrine:        no heat or cold intolerance and no polyphagia, polydipsia,  or polyuria  Psych:   No depression no anxiety, no suicidal or homicidal thoughts, no irritability, no decreased energy, no trouble falling asleep, no early awakening , no trouble concentrating             Objective:    Family History   Problem Relation Age of Onset    Early Death Mother 58    Diabetes Mother     Heart Disease Mother     Cancer Father         lukemia    High Blood Pressure Father     Heart Disease Father     Heart Surgery Father         pacemaker       Past Medical History:   Diagnosis Date    Hypertension     Obesity     Thyroid disease        Social History Socioeconomic History    Marital status:      Spouse name: Not on file    Number of children: Not on file    Years of education: Not on file    Highest education level: Not on file   Occupational History    Not on file   Social Needs    Financial resource strain: Not on file    Food insecurity     Worry: Not on file     Inability: Not on file    Transportation needs     Medical: Not on file     Non-medical: Not on file   Tobacco Use    Smoking status: Never Smoker    Smokeless tobacco: Never Used   Substance and Sexual Activity    Alcohol use: No     Alcohol/week: 0.0 standard drinks    Drug use: No    Sexual activity: Not on file   Lifestyle    Physical activity     Days per week: Not on file     Minutes per session: Not on file    Stress: Not on file   Relationships    Social connections     Talks on phone: Not on file     Gets together: Not on file     Attends Spiritism service: Not on file     Active member of club or organization: Not on file     Attends meetings of clubs or organizations: Not on file     Relationship status: Not on file    Intimate partner violence     Fear of current or ex partner: Not on file     Emotionally abused: Not on file     Physically abused: Not on file     Forced sexual activity: Not on file   Other Topics Concern    Not on file   Social History Narrative    Not on file           Scheduled Meds:  Current Outpatient Medications   Medication Sig Dispense Refill    SYNTHROID 50 MCG tablet Take 1 tablet by mouth Daily 90 tablet 0    irbesartan-hydroCHLOROthiazide (AVALIDE) 150-12.5 MG per tablet Take 1 tablet by mouth daily 90 tablet 0     No current facility-administered medications for this visit.                 Wt Readings from Last 3 Encounters:   12/07/20 250 lb (113.4 kg)   05/17/19 245 lb 3.2 oz (111.2 kg)   04/05/19 246 lb 12.8 oz (111.9 kg)         Vitals:    12/07/20 0925   BP: 124/78   Pulse: 74   Resp: 18   Temp: 97 °F (36.1 °C)   SpO2: 98% Physical Exam    Physical Exam  General Appearance: alert and oriented to person, place and time, well developed and well- nourished, in no acute distress  Skin:    warm and dry, no rash or erythema  Head:    normocephalic and atraumatic  Eyes:    pupils equal, round, and reactive to light, extraocular eye movements intact, conjunctivae normal  ENT:    tympanic membrane, external ear and ear canal normal bilaterally, nose without deformity, nasal mucosa and turbinates     normal without polyps  Neck:    supple and non-tender without mass, no thyromegaly or thyroid nodules, no cervical lymphadenopathy  Pulmonary/Chest:  clear to auscultation bilaterally- no wheezes, rales or rhonchi, normal air movement, no respiratory distress  Cardiovascular:  normal rate, regular rhythm, , no murmurs, rubs, clicks, or gallops, distal pulses intact, no carotid bruits  Abdomen:   soft, non-tender, non-distended, normal bowel sounds, no masses or organomegaly, normal palpation  Extremities:   no cyanosis, clubbing or edema, pedal pulses normal  Musculoskeletal:  normal range of motion, no joint swelling, deformity or tenderness, Back normal, no kyphosis, no cva tenderness,  Head and neck normal rom, shoulders normal strength ,  hips normal , gait normal , no cane no walker , no motor deficits,  Neurologic:   reflexes normal and symmetric, no cranial nerve deficit, gait, coordination and speech normal, A & O x 3, Moves all extremities,  No gross neuro deficits ,normal DTR, normal memory, normal judgement/insight, normal affect                                Stable, cont meds recheck 6 mos         Assessment:    1. Essential hypertension    Stable, cont meds recheck 6 mos    Takes irbesartan-hctz 150-12.5  /76        2.  Thyroid disease    Stable, cont meds recheck 6 mos    Takes 50 mcg synthroid   Lab Results   Component Value Date    TSH 3.130 02/16/2018

## 2020-12-08 LAB
ALBUMIN SERPL-MCNC: 4.1 G/DL (ref 3.5–5.2)
ALP BLD-CCNC: 102 U/L (ref 35–104)
ALT SERPL-CCNC: 23 U/L (ref 0–32)
ANION GAP SERPL CALCULATED.3IONS-SCNC: 15 MMOL/L (ref 7–16)
AST SERPL-CCNC: 21 U/L (ref 0–31)
BILIRUB SERPL-MCNC: 0.5 MG/DL (ref 0–1.2)
BUN BLDV-MCNC: 15 MG/DL (ref 8–23)
CALCIUM SERPL-MCNC: 9.7 MG/DL (ref 8.6–10.2)
CHLORIDE BLD-SCNC: 98 MMOL/L (ref 98–107)
CHOLESTEROL, TOTAL: 227 MG/DL (ref 0–199)
CO2: 21 MMOL/L (ref 22–29)
CREAT SERPL-MCNC: 0.7 MG/DL (ref 0.5–1)
GFR AFRICAN AMERICAN: >60
GFR NON-AFRICAN AMERICAN: >60 ML/MIN/1.73
GLUCOSE BLD-MCNC: 132 MG/DL (ref 74–99)
HDLC SERPL-MCNC: 74 MG/DL
LDL CHOLESTEROL CALCULATED: 138 MG/DL (ref 0–99)
POTASSIUM SERPL-SCNC: 4.1 MMOL/L (ref 3.5–5)
SODIUM BLD-SCNC: 134 MMOL/L (ref 132–146)
TOTAL PROTEIN: 7.6 G/DL (ref 6.4–8.3)
TRIGL SERPL-MCNC: 74 MG/DL (ref 0–149)
TSH SERPL DL<=0.05 MIU/L-ACNC: 3.1 UIU/ML (ref 0.27–4.2)
VLDLC SERPL CALC-MCNC: 15 MG/DL

## 2020-12-13 VITALS
TEMPERATURE: 97 F | WEIGHT: 250 LBS | BODY MASS INDEX: 46.01 KG/M2 | OXYGEN SATURATION: 98 % | SYSTOLIC BLOOD PRESSURE: 138 MMHG | HEART RATE: 74 BPM | RESPIRATION RATE: 18 BRPM | DIASTOLIC BLOOD PRESSURE: 88 MMHG | HEIGHT: 62 IN

## 2021-01-12 ENCOUNTER — NURSE ONLY (OUTPATIENT)
Dept: FAMILY MEDICINE CLINIC | Age: 66
End: 2021-01-12

## 2021-01-12 VITALS — TEMPERATURE: 97 F | SYSTOLIC BLOOD PRESSURE: 141 MMHG | DIASTOLIC BLOOD PRESSURE: 72 MMHG

## 2021-02-05 DIAGNOSIS — E07.9 THYROID DISEASE: ICD-10-CM

## 2021-02-05 RX ORDER — LEVOTHYROXINE SODIUM 50 MCG
50 TABLET ORAL DAILY
Qty: 90 TABLET | Refills: 1 | Status: SHIPPED
Start: 2021-02-05 | End: 2021-08-11 | Stop reason: SDUPTHER

## 2021-02-05 RX ORDER — IRBESARTAN AND HYDROCHLOROTHIAZIDE 150; 12.5 MG/1; MG/1
1 TABLET, FILM COATED ORAL DAILY
Qty: 90 TABLET | Refills: 1 | Status: SHIPPED
Start: 2021-02-05 | End: 2021-08-13 | Stop reason: SDUPTHER

## 2021-02-12 ENCOUNTER — IMMUNIZATION (OUTPATIENT)
Dept: PRIMARY CARE CLINIC | Age: 66
End: 2021-02-12
Payer: MEDICARE

## 2021-02-12 PROCEDURE — 0001A COVID-19, PFIZER VACCINE 30MCG/0.3ML DOSE: CPT | Performed by: CLINICAL NURSE SPECIALIST

## 2021-02-12 PROCEDURE — 91300 COVID-19, PFIZER VACCINE 30MCG/0.3ML DOSE: CPT | Performed by: CLINICAL NURSE SPECIALIST

## 2021-03-08 ENCOUNTER — IMMUNIZATION (OUTPATIENT)
Dept: PRIMARY CARE CLINIC | Age: 66
End: 2021-03-08
Payer: MEDICARE

## 2021-03-08 PROCEDURE — 0002A COVID-19, PFIZER VACCINE 30MCG/0.3ML DOSE: CPT | Performed by: PHYSICIAN ASSISTANT

## 2021-03-08 PROCEDURE — 91300 COVID-19, PFIZER VACCINE 30MCG/0.3ML DOSE: CPT | Performed by: PHYSICIAN ASSISTANT

## 2021-03-19 ENCOUNTER — OFFICE VISIT (OUTPATIENT)
Dept: PODIATRY | Age: 66
End: 2021-03-19
Payer: MEDICARE

## 2021-03-19 VITALS — HEIGHT: 64 IN | BODY MASS INDEX: 41.83 KG/M2 | WEIGHT: 245 LBS

## 2021-03-19 DIAGNOSIS — M79.672 LEFT FOOT PAIN: Primary | ICD-10-CM

## 2021-03-19 DIAGNOSIS — M79.672 PAIN IN LEFT FOOT: ICD-10-CM

## 2021-03-19 DIAGNOSIS — R26.2 DIFFICULTY WALKING: ICD-10-CM

## 2021-03-19 DIAGNOSIS — R60.0 LOCALIZED EDEMA: ICD-10-CM

## 2021-03-19 DIAGNOSIS — M72.2 PLANTAR FASCIITIS: Primary | ICD-10-CM

## 2021-03-19 PROCEDURE — 3017F COLORECTAL CA SCREEN DOC REV: CPT | Performed by: PODIATRIST

## 2021-03-19 PROCEDURE — G8484 FLU IMMUNIZE NO ADMIN: HCPCS | Performed by: PODIATRIST

## 2021-03-19 PROCEDURE — G8400 PT W/DXA NO RESULTS DOC: HCPCS | Performed by: PODIATRIST

## 2021-03-19 PROCEDURE — 1090F PRES/ABSN URINE INCON ASSESS: CPT | Performed by: PODIATRIST

## 2021-03-19 PROCEDURE — 4040F PNEUMOC VAC/ADMIN/RCVD: CPT | Performed by: PODIATRIST

## 2021-03-19 PROCEDURE — 99203 OFFICE O/P NEW LOW 30 MIN: CPT | Performed by: PODIATRIST

## 2021-03-19 PROCEDURE — 29580 STRAPPING UNNA BOOT: CPT | Performed by: PODIATRIST

## 2021-03-19 PROCEDURE — G8417 CALC BMI ABV UP PARAM F/U: HCPCS | Performed by: PODIATRIST

## 2021-03-19 PROCEDURE — 1123F ACP DISCUSS/DSCN MKR DOCD: CPT | Performed by: PODIATRIST

## 2021-03-19 PROCEDURE — 1036F TOBACCO NON-USER: CPT | Performed by: PODIATRIST

## 2021-03-19 PROCEDURE — G8427 DOCREV CUR MEDS BY ELIG CLIN: HCPCS | Performed by: PODIATRIST

## 2021-03-19 NOTE — PROGRESS NOTES
Patient is here for left foot pain. Patient states the pain was really bad 1 week ago. Today she is able to walk and still has some pain. The pain was located on the inner side of the left foot. No known cause. Patient has been here a few years and had an unna boot and that helps.  Last ov with Hayden Tillman MD 01/10/2021  Electronically signed by Nava Celestin LPN on 5/86/1377 at 3:85 AM'

## 2021-03-19 NOTE — PROGRESS NOTES
3/19/21     Faith Espinal    : 1955 Sex: female   Age: 74 y.o. Patient was referred by: None  Patient's PCP/Provider is:  Ry Ramos MD    Subjective:    Patient is seen today for evaluation regarding left heel and arch pain. Chief Complaint   Patient presents with    Foot Pain     left       HPI: Patient stated that the issues been going on over the last week and a half and has progressively gotten worse. She did notice some increased swelling and discoloration to the plantar left arch and medial heel region over the last several days. She denies any recent injury or changes in activities. No other additional abnormalities noted at this time. ROS:  Const: Positives and pertinent negatives as per HPI. Musculo: Denies symptoms other than stated above. Neuro: Denies symptoms other than stated above. Skin: Denies symptoms other than stated above. Current Medications:    Current Outpatient Medications:     SYNTHROID 50 MCG tablet, Take 1 tablet by mouth Daily, Disp: 90 tablet, Rfl: 1    irbesartan-hydroCHLOROthiazide (AVALIDE) 150-12.5 MG per tablet, Take 1 tablet by mouth daily, Disp: 90 tablet, Rfl: 1    Allergies:  No Known Allergies    Vitals:    21 0902   Weight: 245 lb (111.1 kg)   Height: 5' 4\" (1.626 m)        Past Medical History:   Diagnosis Date    Hypertension     Obesity     Thyroid disease      Family History   Problem Relation Age of Onset    Early Death Mother 58    Diabetes Mother     Heart Disease Mother     Cancer Father         lukemia    High Blood Pressure Father     Heart Disease Father     Heart Surgery Father         pacemaker     Past Surgical History:   Procedure Laterality Date     SECTION      COLONOSCOPY  2010    CORONARY ARTERY BYPASS GRAFT      SPINE SURGERY      disc ruptured     Social History     Tobacco Use    Smoking status: Never Smoker    Smokeless tobacco: Never Used   Substance Use Topics    Alcohol use:  No Alcohol/week: 0.0 standard drinks    Drug use: No           Diagnostic studies:    Xr Foot Left (min 3 Views)    Result Date: 3/19/2021  EXAMINATION: THREE XRAY VIEWS OF THE LEFT FOOT 3/19/2021 8:57 am COMPARISON: None. HISTORY: ORDERING SYSTEM PROVIDED HISTORY: Left foot pain TECHNOLOGIST PROVIDED HISTORY: Reason for exam:->left foot pain FINDINGS: There is hammertoe deformity of the 2nd through 5th toes. No acute fractures are identified. No dislocation seen. A plantar calcaneal enthesophyte is present along with calcification along the course of the plantar fascia No erosive bony changes are observed     Hammertoe deformities Findings are consistent with plantar fasciitis         Procedures: An unna boot  compressive wrap was applied to the left lower extremity. It's purpose is to  decrease  the amount of edema present, and to allow proper healing of the soft tissues. The patient was instructed to keep the unna boot clean, dry and intact until the next follow up visit. The patient was instructed to look for signs of redness, irritation, blistering and pain. If these or any other symptoms were to develop, they were advised to contact the office immediately for reevaluation. Exam:  VASCULAR: Pedal pulses palpable left foot. Capillary fill time brisk digits 1 through 5 left foot. NEUROLOGICAL: Epicritic sensations intact left lower extremity  DERMATOLOGICAL: Localized edematous issues noted into the plantar left heel and arch region without ecchymotic skin changes present. No plantar calluses or ulcerations noted left foot. MUSCULOSKELETAL: Tenderness noted to palpation into the plantar left heel and arch region. No palpable defect noted along the course of the plantar arch region. Mild antalgic gait noted upon evaluation today. Plan Per Assessment  Hugh Zheng was seen today for foot pain.     Diagnoses and all orders for this visit:    Plantar fasciitis    Localized edema    Pain in left foot Difficulty walking        1. New patient evaluation and management  2. We did order and review x-rays left foot with patient today. No acute abnormalities noted, soft tissue calcification noted into the plantar fascial region left mid arch region. 3. Compression dressing was applied to the left lower extremity as described above. 4. Patient will be followed up in 1 week's time or sooner if needed for continued evaluation and management. She was advised to call the office with any questions or concerns in the interim. Seen By:    Yanet Deng DPM    Electronically signed by Yanet Deng DPM on 3/19/2021 at 12:57 PM      This note was created using voice recognition software. The note was reviewed however may contain grammatical errors.

## 2021-04-02 ENCOUNTER — OFFICE VISIT (OUTPATIENT)
Dept: PODIATRY | Age: 66
End: 2021-04-02
Payer: MEDICARE

## 2021-04-02 VITALS — HEIGHT: 62 IN | WEIGHT: 245 LBS | BODY MASS INDEX: 45.08 KG/M2

## 2021-04-02 DIAGNOSIS — R26.2 DIFFICULTY WALKING: ICD-10-CM

## 2021-04-02 DIAGNOSIS — M72.2 PLANTAR FASCIITIS: Primary | ICD-10-CM

## 2021-04-02 PROBLEM — R60.0 LOCALIZED EDEMA: Status: RESOLVED | Noted: 2021-03-19 | Resolved: 2021-04-02

## 2021-04-02 PROBLEM — M79.672 PAIN IN LEFT FOOT: Status: RESOLVED | Noted: 2021-03-19 | Resolved: 2021-04-02

## 2021-04-02 PROCEDURE — G8427 DOCREV CUR MEDS BY ELIG CLIN: HCPCS | Performed by: PODIATRIST

## 2021-04-02 PROCEDURE — 3017F COLORECTAL CA SCREEN DOC REV: CPT | Performed by: PODIATRIST

## 2021-04-02 PROCEDURE — G8417 CALC BMI ABV UP PARAM F/U: HCPCS | Performed by: PODIATRIST

## 2021-04-02 PROCEDURE — 99212 OFFICE O/P EST SF 10 MIN: CPT | Performed by: PODIATRIST

## 2021-04-02 PROCEDURE — 1036F TOBACCO NON-USER: CPT | Performed by: PODIATRIST

## 2021-04-02 PROCEDURE — 1090F PRES/ABSN URINE INCON ASSESS: CPT | Performed by: PODIATRIST

## 2021-04-02 PROCEDURE — G8400 PT W/DXA NO RESULTS DOC: HCPCS | Performed by: PODIATRIST

## 2021-04-02 PROCEDURE — 4040F PNEUMOC VAC/ADMIN/RCVD: CPT | Performed by: PODIATRIST

## 2021-04-02 PROCEDURE — 1123F ACP DISCUSS/DSCN MKR DOCD: CPT | Performed by: PODIATRIST

## 2021-04-02 NOTE — PROGRESS NOTES
21     Renetta Saha    : 1955   Sex: female    Age: 74 y.o. Patient's PCP/Provider is:  Adam Rowan MD    Subjective:  Patient is seen today for follow-up regarding plantar fascial issues left lower extremity. Overall patient is doing great at this time without any additional issues noted. She did notice significant improvement with the compression dressing applied last visit. She is going to purchase the orthopedic sandals previously recommended to prevent continued issues. Patient will be followed up at a later date for continued evaluation and management. Chief Complaint   Patient presents with    Follow-up     left unna boot       ROS:  Const: Positives and pertinent negatives as per HPI. Musculo: Denies symptoms other than stated above. Neuro: Denies symptoms other than stated above. Skin: Denies symptoms other than stated above. Current Medications:    Current Outpatient Medications:     SYNTHROID 50 MCG tablet, Take 1 tablet by mouth Daily, Disp: 90 tablet, Rfl: 1    irbesartan-hydroCHLOROthiazide (AVALIDE) 150-12.5 MG per tablet, Take 1 tablet by mouth daily, Disp: 90 tablet, Rfl: 1    Allergies:  No Known Allergies    Vitals:    21 0843   Weight: 245 lb (111.1 kg)   Height: 5' 2\" (1.575 m)       Exam:  Neurovascular status unchanged. Increased range of motion noted left ankle and subtalar joint. Edematous changes are resolved left lower extremity. Improved gait noted upon evaluation. Diagnostic Studies:     Xr Foot Left (min 3 Views)    Result Date: 3/19/2021  EXAMINATION: THREE XRAY VIEWS OF THE LEFT FOOT 3/19/2021 8:57 am COMPARISON: None. HISTORY: ORDERING SYSTEM PROVIDED HISTORY: Left foot pain TECHNOLOGIST PROVIDED HISTORY: Reason for exam:->left foot pain FINDINGS: There is hammertoe deformity of the 2nd through 5th toes. No acute fractures are identified. No dislocation seen.  A plantar calcaneal enthesophyte is present along with calcification along the course of the plantar fascia No erosive bony changes are observed     Hammertoe deformities Findings are consistent with plantar fasciitis         Procedures:    None    Plan Per Assessment  Melanie Mcmillan was seen today for follow-up. Diagnoses and all orders for this visit:    Plantar fasciitis    Difficulty walking      1. Evaluation and management  2. We did advised the patient continued use of her good supportive shoe gear with all ambulatory activities. Patient is going to purchase the sandals to wear as a house shoe to give her added support with her every day home activities as well. 3. We did advise use of a compression sock as needed with certain activities. 4. Patient will be followed up at a later date for continued evaluation and management if any additional Podiatric issues arise. Seen By:    Bushra Kaur DPM    Electronically signed by Bushra Kaur DPM on 4/2/2021 at 9:00 AM    This note was created using voice recognition software. The note was reviewed however may contain grammatical errors.

## 2021-04-02 NOTE — PROGRESS NOTES
Patient is here follow up left foot. Patient states that the unna boot help a lot.  Tarah Napier MD 1/2021  Electronically signed by Sebas Dominguez LPN on 2/7/5811 at 5:72 AM

## 2021-07-26 RX ORDER — IRBESARTAN AND HYDROCHLOROTHIAZIDE 150; 12.5 MG/1; MG/1
TABLET, FILM COATED ORAL
Qty: 90 TABLET | Refills: 1 | OUTPATIENT
Start: 2021-07-26

## 2021-07-30 DIAGNOSIS — E07.9 THYROID DISEASE: ICD-10-CM

## 2021-07-30 RX ORDER — LEVOTHYROXINE SODIUM 50 MCG
TABLET ORAL
Qty: 90 TABLET | Refills: 1 | OUTPATIENT
Start: 2021-07-30

## 2021-08-11 DIAGNOSIS — E07.9 THYROID DISEASE: ICD-10-CM

## 2021-08-11 RX ORDER — LEVOTHYROXINE SODIUM 50 MCG
50 TABLET ORAL DAILY
Qty: 30 TABLET | Refills: 0 | Status: SHIPPED
Start: 2021-08-11 | End: 2021-09-13 | Stop reason: SDUPTHER

## 2021-08-13 RX ORDER — IRBESARTAN AND HYDROCHLOROTHIAZIDE 150; 12.5 MG/1; MG/1
TABLET, FILM COATED ORAL
Qty: 90 TABLET | Refills: 1 | OUTPATIENT
Start: 2021-08-13

## 2021-08-13 RX ORDER — IRBESARTAN AND HYDROCHLOROTHIAZIDE 150; 12.5 MG/1; MG/1
1 TABLET, FILM COATED ORAL DAILY
Qty: 30 TABLET | Refills: 0 | Status: SHIPPED
Start: 2021-08-13 | End: 2021-09-13 | Stop reason: SDUPTHER

## 2021-08-13 NOTE — TELEPHONE ENCOUNTER
Spoke with Patient and she is going to call and schedule OV once she gets her Daughters schedule next week. Also needs refill on BP medication as well.

## 2021-08-25 LAB
ALT SERPL-CCNC: 21 U/L (ref 0–32)
AST SERPL-CCNC: 20 U/L (ref 0–31)
CHOLESTEROL, TOTAL: 151 MG/DL (ref 0–199)
HDLC SERPL-MCNC: 71 MG/DL
LDL CHOLESTEROL CALCULATED: 65 MG/DL (ref 0–99)
TRIGL SERPL-MCNC: 76 MG/DL (ref 0–149)
VLDLC SERPL CALC-MCNC: 15 MG/DL

## 2021-09-05 DIAGNOSIS — E07.9 THYROID DISEASE: ICD-10-CM

## 2021-09-07 RX ORDER — LEVOTHYROXINE SODIUM 50 MCG
TABLET ORAL
Qty: 30 TABLET | Refills: 0 | OUTPATIENT
Start: 2021-09-07

## 2021-09-08 RX ORDER — IRBESARTAN AND HYDROCHLOROTHIAZIDE 150; 12.5 MG/1; MG/1
TABLET, FILM COATED ORAL
Qty: 30 TABLET | Refills: 0 | OUTPATIENT
Start: 2021-09-08

## 2021-09-13 DIAGNOSIS — E07.9 THYROID DISEASE: ICD-10-CM

## 2021-09-13 RX ORDER — LEVOTHYROXINE SODIUM 50 MCG
50 TABLET ORAL DAILY
Qty: 30 TABLET | Refills: 0 | Status: SHIPPED
Start: 2021-09-13 | End: 2021-09-24 | Stop reason: SDUPTHER

## 2021-09-13 RX ORDER — IRBESARTAN AND HYDROCHLOROTHIAZIDE 150; 12.5 MG/1; MG/1
1 TABLET, FILM COATED ORAL DAILY
Qty: 30 TABLET | Refills: 0 | Status: SHIPPED
Start: 2021-09-13 | End: 2021-09-24 | Stop reason: SDUPTHER

## 2021-09-13 NOTE — TELEPHONE ENCOUNTER
Refill Avalide 150-12.5 mg, Synthroid 50 mcg,   Medication Refill Request    LOV 12/7/2020  NOV 9/24/2021    Lab Results   Component Value Date    CREATININE 0.7 12/07/2020

## 2021-09-24 ENCOUNTER — OFFICE VISIT (OUTPATIENT)
Dept: FAMILY MEDICINE CLINIC | Age: 66
End: 2021-09-24
Payer: MEDICARE

## 2021-09-24 VITALS
HEIGHT: 61 IN | WEIGHT: 249 LBS | RESPIRATION RATE: 16 BRPM | TEMPERATURE: 96.9 F | BODY MASS INDEX: 47.01 KG/M2 | SYSTOLIC BLOOD PRESSURE: 138 MMHG | OXYGEN SATURATION: 96 % | HEART RATE: 122 BPM | DIASTOLIC BLOOD PRESSURE: 80 MMHG

## 2021-09-24 DIAGNOSIS — Z00.00 ROUTINE GENERAL MEDICAL EXAMINATION AT A HEALTH CARE FACILITY: ICD-10-CM

## 2021-09-24 DIAGNOSIS — Z23 NEED FOR INFLUENZA VACCINATION: Primary | ICD-10-CM

## 2021-09-24 DIAGNOSIS — E07.9 THYROID DISEASE: ICD-10-CM

## 2021-09-24 DIAGNOSIS — E11.9 TYPE 2 DIABETES MELLITUS WITHOUT COMPLICATION, WITHOUT LONG-TERM CURRENT USE OF INSULIN (HCC): ICD-10-CM

## 2021-09-24 DIAGNOSIS — I10 ESSENTIAL HYPERTENSION: ICD-10-CM

## 2021-09-24 LAB
CREATININE URINE POCT: NORMAL
HBA1C MFR BLD: 6.7 %
MICROALBUMIN/CREAT 24H UR: NORMAL MG/G{CREAT}
MICROALBUMIN/CREAT UR-RTO: NORMAL

## 2021-09-24 PROCEDURE — 1123F ACP DISCUSS/DSCN MKR DOCD: CPT | Performed by: FAMILY MEDICINE

## 2021-09-24 PROCEDURE — 4040F PNEUMOC VAC/ADMIN/RCVD: CPT | Performed by: FAMILY MEDICINE

## 2021-09-24 PROCEDURE — 90694 VACC AIIV4 NO PRSRV 0.5ML IM: CPT | Performed by: FAMILY MEDICINE

## 2021-09-24 PROCEDURE — G0438 PPPS, INITIAL VISIT: HCPCS | Performed by: FAMILY MEDICINE

## 2021-09-24 PROCEDURE — 3017F COLORECTAL CA SCREEN DOC REV: CPT | Performed by: FAMILY MEDICINE

## 2021-09-24 PROCEDURE — 82044 UR ALBUMIN SEMIQUANTITATIVE: CPT | Performed by: FAMILY MEDICINE

## 2021-09-24 PROCEDURE — G0008 ADMIN INFLUENZA VIRUS VAC: HCPCS | Performed by: FAMILY MEDICINE

## 2021-09-24 PROCEDURE — 83036 HEMOGLOBIN GLYCOSYLATED A1C: CPT | Performed by: FAMILY MEDICINE

## 2021-09-24 PROCEDURE — 3044F HG A1C LEVEL LT 7.0%: CPT | Performed by: FAMILY MEDICINE

## 2021-09-24 RX ORDER — ATORVASTATIN CALCIUM 20 MG/1
20 TABLET, FILM COATED ORAL DAILY
COMMUNITY
Start: 2021-09-21

## 2021-09-24 RX ORDER — IRBESARTAN AND HYDROCHLOROTHIAZIDE 150; 12.5 MG/1; MG/1
1 TABLET, FILM COATED ORAL DAILY
Qty: 90 TABLET | Refills: 1 | Status: SHIPPED
Start: 2021-09-24 | End: 2022-04-11 | Stop reason: SDUPTHER

## 2021-09-24 RX ORDER — LEVOTHYROXINE SODIUM 50 MCG
50 TABLET ORAL DAILY
Qty: 90 TABLET | Refills: 1 | Status: SHIPPED
Start: 2021-09-24 | End: 2022-04-11 | Stop reason: SDUPTHER

## 2021-09-24 SDOH — ECONOMIC STABILITY: FOOD INSECURITY: WITHIN THE PAST 12 MONTHS, THE FOOD YOU BOUGHT JUST DIDN'T LAST AND YOU DIDN'T HAVE MONEY TO GET MORE.: NEVER TRUE

## 2021-09-24 SDOH — ECONOMIC STABILITY: FOOD INSECURITY: WITHIN THE PAST 12 MONTHS, YOU WORRIED THAT YOUR FOOD WOULD RUN OUT BEFORE YOU GOT MONEY TO BUY MORE.: NEVER TRUE

## 2021-09-24 SDOH — ECONOMIC STABILITY: TRANSPORTATION INSECURITY
IN THE PAST 12 MONTHS, HAS THE LACK OF TRANSPORTATION KEPT YOU FROM MEDICAL APPOINTMENTS OR FROM GETTING MEDICATIONS?: NO

## 2021-09-24 SDOH — ECONOMIC STABILITY: HOUSING INSECURITY: IN THE LAST 12 MONTHS, HOW MANY PLACES HAVE YOU LIVED?: 1

## 2021-09-24 SDOH — ECONOMIC STABILITY: TRANSPORTATION INSECURITY
IN THE PAST 12 MONTHS, HAS LACK OF TRANSPORTATION KEPT YOU FROM MEETINGS, WORK, OR FROM GETTING THINGS NEEDED FOR DAILY LIVING?: NO

## 2021-09-24 SDOH — ECONOMIC STABILITY: HOUSING INSECURITY
IN THE LAST 12 MONTHS, WAS THERE A TIME WHEN YOU DID NOT HAVE A STEADY PLACE TO SLEEP OR SLEPT IN A SHELTER (INCLUDING NOW)?: NO

## 2021-09-24 SDOH — ECONOMIC STABILITY: INCOME INSECURITY: IN THE LAST 12 MONTHS, WAS THERE A TIME WHEN YOU WERE NOT ABLE TO PAY THE MORTGAGE OR RENT ON TIME?: NO

## 2021-09-24 ASSESSMENT — LIFESTYLE VARIABLES
HOW OFTEN DURING THE LAST YEAR HAVE YOU FAILED TO DO WHAT WAS NORMALLY EXPECTED FROM YOU BECAUSE OF DRINKING: 0
HAVE YOU OR SOMEONE ELSE BEEN INJURED AS A RESULT OF YOUR DRINKING: 0
AUDIT-C TOTAL SCORE: 1
HOW OFTEN DURING THE LAST YEAR HAVE YOU BEEN UNABLE TO REMEMBER WHAT HAPPENED THE NIGHT BEFORE BECAUSE YOU HAD BEEN DRINKING: 0
AUDIT TOTAL SCORE: 1
HOW OFTEN DO YOU HAVE A DRINK CONTAINING ALCOHOL: 1
HOW MANY STANDARD DRINKS CONTAINING ALCOHOL DO YOU HAVE ON A TYPICAL DAY: 0
HOW OFTEN DURING THE LAST YEAR HAVE YOU HAD A FEELING OF GUILT OR REMORSE AFTER DRINKING: 0
HAS A RELATIVE, FRIEND, DOCTOR, OR ANOTHER HEALTH PROFESSIONAL EXPRESSED CONCERN ABOUT YOUR DRINKING OR SUGGESTED YOU CUT DOWN: 0
HOW OFTEN DURING THE LAST YEAR HAVE YOU NEEDED AN ALCOHOLIC DRINK FIRST THING IN THE MORNING TO GET YOURSELF GOING AFTER A NIGHT OF HEAVY DRINKING: 0
HOW OFTEN DO YOU HAVE SIX OR MORE DRINKS ON ONE OCCASION: 0
HOW OFTEN DURING THE LAST YEAR HAVE YOU FOUND THAT YOU WERE NOT ABLE TO STOP DRINKING ONCE YOU HAD STARTED: 0

## 2021-09-24 ASSESSMENT — PATIENT HEALTH QUESTIONNAIRE - PHQ9
SUM OF ALL RESPONSES TO PHQ9 QUESTIONS 1 & 2: 0
SUM OF ALL RESPONSES TO PHQ QUESTIONS 1-9: 0
SUM OF ALL RESPONSES TO PHQ QUESTIONS 1-9: 0
2. FEELING DOWN, DEPRESSED OR HOPELESS: 0
SUM OF ALL RESPONSES TO PHQ QUESTIONS 1-9: 0
1. LITTLE INTEREST OR PLEASURE IN DOING THINGS: 0

## 2021-09-24 ASSESSMENT — SOCIAL DETERMINANTS OF HEALTH (SDOH): HOW HARD IS IT FOR YOU TO PAY FOR THE VERY BASICS LIKE FOOD, HOUSING, MEDICAL CARE, AND HEATING?: NOT HARD AT ALL

## 2021-09-24 NOTE — PROGRESS NOTES
Medicare Annual Wellness Visit  Name: Mary Soto Date: 2021   MRN: [id number] Sex: Female   Age: 74 y.o. Ethnicity: Non- / Non    : 1955 Race: White (non-)      Silvano Tidwell is here for Medicare AWV, Hypertension (6 month check up), Diabetes, and Thyroid Problem    Screenings for behavioral, psychosocial and functional/safety risks, and cognitive dysfunction are all negative except as indicated below. These results, as well as other patient data from the [address] form, are documented in Flowsheets linked to this Encounter. No Known Allergies      Prior to Visit Medications    Medication Sig Taking?  Authorizing Provider   atorvastatin (LIPITOR) 20 MG tablet Take 20 mg by mouth daily Yes Historical Provider, MD   irbesartan-hydroCHLOROthiazide (AVALIDE) 150-12.5 MG per tablet Take 1 tablet by mouth daily Yes Sindy Salazar MD   SYNTHROID 50 MCG tablet Take 1 tablet by mouth Daily Yes Sindy Salazar MD         Past Medical History:   Diagnosis Date    Hypertension     Obesity     Thyroid disease        Past Surgical History:   Procedure Laterality Date     SECTION      COLONOSCOPY      CORONARY ARTERY BYPASS GRAFT      SPINE SURGERY      disc ruptured         Family History   Problem Relation Age of Onset    Early Death Mother 58    Diabetes Mother     Heart Disease Mother     Cancer Father         lukemia    High Blood Pressure Father     Heart Disease Father     Heart Surgery Father         pacemaker       CareTeam (Including outside providers/suppliers regularly involved in providing care):   Patient Care Team:  Sindy Salazar MD as PCP - Maria Del Carmen Bowles MD as PCP - [facility] Empaneled Provider    Wt Readings from Last 3 Encounters:   21 249 lb (112.9 kg)   21 245 lb (111.1 kg)   21 245 lb (111.1 kg)     Vitals:    21 1518   BP: 138/80   Site: Left Upper Arm   Position: Sitting   Cuff Size: Large Adult   Pulse: 122   Resp: 16   Temp: 96.9 °F (36.1 °C)   TempSrc: Temporal   SpO2: 96%   Weight: 249 lb (112.9 kg)   Height: 5' 1\" (1.549 m)     Body mass index is 47.05 kg/m². Based upon direct observation of the patient, evaluation of cognition reveals recent and remote memory intact. General Appearance: alert and oriented to person, place and time, well developed and well- nourished, in no acute distress  Skin: warm and dry, no rash or erythema  Head: normocephalic and atraumatic  Neck: supple and non-tender without mass, no thyromegaly or thyroid nodules, no cervical lymphadenopathy  Pulmonary/Chest: clear to auscultation bilaterally- no wheezes, rales or rhonchi, normal air movement, no respiratory distress  Cardiovascular: normal rate, regular rhythm, normal S1 and S2, no murmurs, rubs, clicks, or gallops, distal pulses intact, no carotid bruits  Abdomen: soft, non-tender, non-distended, normal bowel sounds, no masses or organomegaly  Extremities: no cyanosis, clubbing or edema  Musculoskeletal: normal range of motion, no joint swelling, deformity or tenderness  Neurologic: reflexes normal and symmetric, no cranial nerve deficit, gait, coordination and speech normal    Patient's complete Health Risk Assessment and screening values have been reviewed and are found in Flowsheets. The following problems were reviewed today and where indicated follow up appointments were made and/or referrals ordered. Positive Risk Factor Screenings with Interventions:            General Health and ACP:  General  In general, how would you say your health is?: Good  In the past 7 days, have you experienced any of the following?  New or Increased Pain, New or Increased Fatigue, Loneliness, Social Isolation, Stress or Anger?: None of These  Do you get the social and emotional support that you need?: Yes  Do you have a Living Will?: Yes  Advance Directives     Power of  Living Will ACP-Advance Directive ACP-Power of     Not on File Not on File Not on File Not on File      General Health Risk Interventions:  · . Health Habits/Nutrition:  Health Habits/Nutrition  Do you exercise for at least 20 minutes 2-3 times per week?: Yes  Have you lost any weight without trying in the past 3 months?: No  Do you eat only one meal per day?: No  Have you seen the dentist within the past year?: Yes  Body mass index: (!) 47.04  Health Habits/Nutrition Interventions:  · Inadequate physical activity:  patient is not ready to increase his/her physical activity level at this time    Hearing/Vision:  No exam data present  Hearing/Vision  Do you or your family notice any trouble with your hearing that hasn't been managed with hearing aids?: (!) Yes  Do you have difficulty driving, watching TV, or doing any of your daily activities because of your eyesight?: No  Have you had an eye exam within the past year?: (!) No  Hearing/Vision Interventions:  · Hearing concerns:  patient declines any further evaluation/treatment for hearing issues  · Vision concerns:  patient encouraged to make appointment with his/her eye specialist    Safety:  Safety  Do you have working smoke detectors?: Yes  Have all throw rugs been removed or fastened?: (!) No  Do you have non-slip mats or surfaces in all bathtubs/showers?: Yes  Do all of your stairways have a railing or banister?: Yes  Are your doorways, halls and stairs free of clutter?: Yes  Do you always fasten your seatbelt when you are in a car?: Yes  Safety Interventions:  · Home safety tips provided    ADL:  ADLs  In the past 7 days, did you need help from others to take care of any of the following? Laundry, housekeeping, banking/finances, shopping, telephone use, food preparation, transportation, or taking medications?: (!) Housekeeping  ADL Interventions:  · .     Personalized Preventive Plan   Current Health Maintenance Status  Immunization History   Administered Date(s) Administered    COVID-19, Pfizer, PF, 30mcg/0.3mL 02/12/2021, 03/08/2021    Influenza Virus Vaccine 10/19/2018, 10/22/2019    Influenza, Quadv, adjuvanted, 65 yrs +, IM, PF (Fluad) 09/24/2021    Pneumococcal Conjugate 13-valent ([id number]) 06/06/2018    Pneumococcal Polysaccharide ([id number]) 12/07/2020    Tdap (Boostrix, Adacel) 02/16/2018, 06/06/2018    Zoster Recombinant (Shingrix) 01/21/2020, 07/02/2020        Health Maintenance   Topic Date Due    Hepatitis C screen  Never done    Diabetic foot exam  Never done    Diabetic retinal exam  Never done    DEXA (modify frequency per FRAX score)  Never done   [employer] Visit (AWV)  Never done    Potassium monitoring  12/07/2021    Creatinine monitoring  12/07/2021    Lipid screen  08/25/2022    A1C test (Diabetic or Prediabetic)  09/24/2022    Diabetic microalbuminuria test  09/24/2022    Breast cancer screen  10/20/2022    DTaP/Tdap/Td vaccine (3 - Td or Tdap) 06/06/2028    Colon cancer screen colonoscopy  05/10/2031    Flu vaccine  Completed    Shingles Vaccine  Completed    Pneumococcal 65+ years Vaccine  Completed    COVID-19 Vaccine  Completed    Hepatitis A vaccine  Aged Out    Hib vaccine  Aged Out    Meningococcal (ACWY) vaccine  Aged Out     Recommendations for [employer] Due: see orders and patient instructions/AVS.  . Recommended screening schedule for the next 5-10 years is provided to the patient in written form: see Patient Instructions/AVS.    Enrique Hull was seen today for medicare awv, hypertension, diabetes and thyroid problem. Diagnoses and all orders for this visit:    Need for influenza vaccination  -     INFLUENZA, QUADV, ADJUVANTED, 72 YRS =, IM, PF, PREFILL SYR, 0.5ML (FLUAD)    Thyroid disease. lasttsh  Lab Results   Component Value Date    TSH 3.100 12/07/2020       -     SYNTHROID 50 MCG tablet; Take 1 tablet by mouth Daily  -     TSH without Reflex;  Future    Type 2 diabetes mellitus without complication, without long-term current use of insulin ([facility]  Lab Results   Component Value Date    LABA1C 6.7 09/24/2021     No results found for: EAG    -     POCT glycosylated hemoglobin (Hb A1C)  -     POCT microalbumin    Body mass index (BMI) 45.0-49.9, adult  Worsening 245 now 249    Routine general medical examination at a health care facility        6. Essential hypertension  Stable, cont meds recheck 6 mos    - atorvastatin (LIPITOR) 20 MG tablet; Take 20 mg by mouth daily  - irbesartan-hydroCHLOROthiazide (AVALIDE) 150-12.5 MG per tablet; Take 1 tablet by mouth daily  Dispense: 90 tablet;  Refill: 1

## 2021-09-24 NOTE — PATIENT INSTRUCTIONS
Personalized Preventive Plan for [place] - 9/24/2021  Medicare offers a range of preventive health benefits. Some of the tests and screenings are paid in full while other may be subject to a deductible, co-insurance, and/or copay. Some of these benefits include a comprehensive review of your medical history including lifestyle, illnesses that may run in your family, and various assessments and screenings as appropriate. After reviewing your medical record and screening and assessments performed today your provider may have ordered immunizations, labs, imaging, and/or referrals for you. A list of these orders (if applicable) as well as your Preventive Care list are included within your After Visit Summary for your review. Other Preventive Recommendations:    · A preventive eye exam performed by an eye specialist is recommended every 1-2 years to screen for glaucoma; cataracts, macular degeneration, and other eye disorders. · A preventive dental visit is recommended every 6 months. · Try to get at least 150 minutes of exercise per week or 10,000 steps per day on a pedometer . · Order or download the FREE \"Exercise & Physical Activity: Your Everyday Guide\" from [employer]. Call 0-466.907.4855 or search [employer] online. · You need 5433-3934 mg of calcium and 5617-2740 IU of vitamin D per day. It is possible to meet your calcium requirement with diet alone, but a vitamin D supplement is usually necessary to meet this goal.  · When exposed to the sun, use a sunscreen that protects against both UVA and UVB radiation with an SPF of 30 or greater. Reapply every 2 to 3 hours or after sweating, drying off with a towel, or swimming. · Always wear a seat belt when traveling in a car. Always wear a helmet when riding a bicycle or motorcycle.

## 2022-04-05 DIAGNOSIS — E07.9 THYROID DISEASE: ICD-10-CM

## 2022-04-05 DIAGNOSIS — I10 ESSENTIAL HYPERTENSION: ICD-10-CM

## 2022-04-05 RX ORDER — IRBESARTAN AND HYDROCHLOROTHIAZIDE 150; 12.5 MG/1; MG/1
TABLET, FILM COATED ORAL
Qty: 90 TABLET | Refills: 1 | OUTPATIENT
Start: 2022-04-05

## 2022-04-05 RX ORDER — LEVOTHYROXINE SODIUM 50 MCG
TABLET ORAL
Qty: 90 TABLET | Refills: 1 | OUTPATIENT
Start: 2022-04-05

## 2022-04-11 DIAGNOSIS — E07.9 THYROID DISEASE: ICD-10-CM

## 2022-04-11 DIAGNOSIS — I10 ESSENTIAL HYPERTENSION: ICD-10-CM

## 2022-04-11 RX ORDER — LEVOTHYROXINE SODIUM 50 MCG
50 TABLET ORAL DAILY
Qty: 90 TABLET | Refills: 1 | Status: SHIPPED
Start: 2022-04-11 | End: 2022-09-06 | Stop reason: DRUGHIGH

## 2022-04-11 RX ORDER — IRBESARTAN AND HYDROCHLOROTHIAZIDE 150; 12.5 MG/1; MG/1
1 TABLET, FILM COATED ORAL DAILY
Qty: 90 TABLET | Refills: 1 | Status: SHIPPED
Start: 2022-04-11 | End: 2022-10-06

## 2022-04-11 NOTE — TELEPHONE ENCOUNTER
Pt called and needs a rx for Synthroid 50mcg and Avalide 150/ 12.5 mg. Send to [facility] in [place] . [facility] ph# [phone].

## 2022-04-11 NOTE — TELEPHONE ENCOUNTER
Medication Refill Request    LOV 9/24/2021  NOV 5/9/2022    Lab Results   Component Value Date    CREATININE 0.7 12/07/2020

## 2022-07-18 ENCOUNTER — OFFICE VISIT (OUTPATIENT)
Dept: FAMILY MEDICINE CLINIC | Age: 67
End: 2022-07-18
Payer: MEDICARE

## 2022-07-18 VITALS
BODY MASS INDEX: 47.77 KG/M2 | DIASTOLIC BLOOD PRESSURE: 80 MMHG | WEIGHT: 252.8 LBS | TEMPERATURE: 98 F | RESPIRATION RATE: 16 BRPM | SYSTOLIC BLOOD PRESSURE: 142 MMHG | HEART RATE: 114 BPM | OXYGEN SATURATION: 97 %

## 2022-07-18 DIAGNOSIS — E78.00 ELEVATED CHOLESTEROL: ICD-10-CM

## 2022-07-18 DIAGNOSIS — I10 PRIMARY HYPERTENSION: ICD-10-CM

## 2022-07-18 DIAGNOSIS — E11.9 TYPE 2 DIABETES MELLITUS WITHOUT COMPLICATION, WITHOUT LONG-TERM CURRENT USE OF INSULIN (HCC): Primary | ICD-10-CM

## 2022-07-18 DIAGNOSIS — E07.9 THYROID DISEASE: ICD-10-CM

## 2022-07-18 DIAGNOSIS — E66.01 CLASS 3 SEVERE OBESITY DUE TO EXCESS CALORIES WITHOUT SERIOUS COMORBIDITY WITH BODY MASS INDEX (BMI) OF 45.0 TO 49.9 IN ADULT (HCC): ICD-10-CM

## 2022-07-18 LAB — HBA1C MFR BLD: 6.9 %

## 2022-07-18 PROCEDURE — G8400 PT W/DXA NO RESULTS DOC: HCPCS | Performed by: FAMILY MEDICINE

## 2022-07-18 PROCEDURE — 83036 HEMOGLOBIN GLYCOSYLATED A1C: CPT | Performed by: FAMILY MEDICINE

## 2022-07-18 PROCEDURE — G8417 CALC BMI ABV UP PARAM F/U: HCPCS | Performed by: FAMILY MEDICINE

## 2022-07-18 PROCEDURE — 1036F TOBACCO NON-USER: CPT | Performed by: FAMILY MEDICINE

## 2022-07-18 PROCEDURE — 99214 OFFICE O/P EST MOD 30 MIN: CPT | Performed by: FAMILY MEDICINE

## 2022-07-18 PROCEDURE — 1123F ACP DISCUSS/DSCN MKR DOCD: CPT | Performed by: FAMILY MEDICINE

## 2022-07-18 PROCEDURE — 3044F HG A1C LEVEL LT 7.0%: CPT | Performed by: FAMILY MEDICINE

## 2022-07-18 PROCEDURE — 1090F PRES/ABSN URINE INCON ASSESS: CPT | Performed by: FAMILY MEDICINE

## 2022-07-18 PROCEDURE — 2022F DILAT RTA XM EVC RTNOPTHY: CPT | Performed by: FAMILY MEDICINE

## 2022-07-18 PROCEDURE — G8427 DOCREV CUR MEDS BY ELIG CLIN: HCPCS | Performed by: FAMILY MEDICINE

## 2022-07-18 PROCEDURE — 3017F COLORECTAL CA SCREEN DOC REV: CPT | Performed by: FAMILY MEDICINE

## 2022-07-18 ASSESSMENT — PATIENT HEALTH QUESTIONNAIRE - PHQ9
SUM OF ALL RESPONSES TO PHQ9 QUESTIONS 1 & 2: 0
SUM OF ALL RESPONSES TO PHQ QUESTIONS 1-9: 0
1. LITTLE INTEREST OR PLEASURE IN DOING THINGS: 0
SUM OF ALL RESPONSES TO PHQ QUESTIONS 1-9: 0
2. FEELING DOWN, DEPRESSED OR HOPELESS: 0
SUM OF ALL RESPONSES TO PHQ QUESTIONS 1-9: 0
SUM OF ALL RESPONSES TO PHQ QUESTIONS 1-9: 0

## 2022-07-18 ASSESSMENT — LIFESTYLE VARIABLES
HOW OFTEN DO YOU HAVE A DRINK CONTAINING ALCOHOL: MONTHLY OR LESS
HOW MANY STANDARD DRINKS CONTAINING ALCOHOL DO YOU HAVE ON A TYPICAL DAY: 1 OR 2

## 2022-07-18 NOTE — PROGRESS NOTES
2022    Current Outpatient Medications   Medication Sig Dispense Refill    SYNTHROID 50 MCG tablet Take 1 tablet by mouth Daily 90 tablet 1    irbesartan-hydroCHLOROthiazide (AVALIDE) 150-12.5 MG per tablet Take 1 tablet by mouth daily 90 tablet 1    atorvastatin (LIPITOR) 20 MG tablet Take 20 mg by mouth daily       No current facility-administered medications for this visit. Josr Aleman (: 1955 IS A 69 y.o. female ,Established patient, here for eval of Diabetes (6 month check up) and Hypertension    In general doing ok  Overweight  Not really living a healthy lifestyle      ASSESSMENT / PLAN      1. Type 2 diabetes mellitus without complication, without long-term current use of insulin (HCC)  Lab Results   Component Value Date    LABA1C 6.9 2022     No results found for: EAG  Not on any medication  Not improved or worsening,  medication change as above recheck 3 mos    - POCT glycosylated hemoglobin (Hb A1C)  - CBC with Auto Differential; Future  - Comprehensive Metabolic Panel; Future    2. Class 3 severe obesity due to excess calories without serious comorbidity with body mass index (BMI) of 45.0 to 49.9 in adult [facility])  Wt Readings from Last 3 Encounters:   22 252 lb 12.8 oz (114.7 kg)   21 249 lb (112.9 kg)   21 245 lb (111.1 kg)     Not improved or worsening,  medication change as above recheck 3 mos  Weight increasing    3. Primary hypertension  Vitals:    22 1613   BP: (!) 142/80   Pulse:    Resp:    Temp:    SpO2:      On avalide  Needs to watch weight anddiet  Not improved or worsening,  medication change as above recheck 3 mos      4. Thyroid disease  Lab Results   Component Value Date    TSH 3.100 2020     Ok on synthroid 50 mcg  Stable, cont meds recheck 6 mos    - TSH; Future    5. Elevated cholesterol  . ascvdl  The 10-year ASCVD risk score (Clover Rodriguez, et al., 2013) is: 21.7%    Values used to calculate the score:      Age: 69 years Sex: Female      Is Non- : No      Diabetic: Yes      Tobacco smoker: No      Systolic Blood Pressure: 519 mmHg      Is BP treated: Yes      HDL Cholesterol: 71 mg/dL      Total Cholesterol: 151 mg/dL  Oo=n lipitor 20 mg daily  . ucll    - Lipid Panel; Future              SUBJECTIVE    Review of Systems   Constitutional:  Negative for activity change, appetite change, fatigue and unexpected weight change. HENT:  Negative for congestion. Eyes: Negative. Negative for visual disturbance. Respiratory:  Negative for cough, chest tightness, shortness of breath and wheezing. Cardiovascular:  Negative for chest pain and palpitations. Gastrointestinal: Negative. Endocrine: Negative. Genitourinary: Negative. Musculoskeletal: Negative. Skin:  Negative for pallor, rash and wound. Allergic/Immunologic: Negative. Neurological: Negative. Negative for syncope. Hematological: Negative. Psychiatric/Behavioral: Negative. OBJECTIVE    Wt Readings from Last 3 Encounters:   07/18/22 252 lb 12.8 oz (114.7 kg)   09/24/21 249 lb (112.9 kg)   04/02/21 245 lb (111.1 kg)       Vitals:    07/18/22 1613   BP: (!) 142/80   Pulse:    Resp:    Temp:    SpO2:        Physical Exam  Vitals and nursing note reviewed. Constitutional:       Appearance: Normal appearance. Cardiovascular:      Rate and Rhythm: Normal rate and regular rhythm. Pulses: Normal pulses. Heart sounds: Normal heart sounds. Pulmonary:      Effort: Pulmonary effort is normal. No respiratory distress. Breath sounds: Normal breath sounds. Abdominal:      General: Bowel sounds are normal. There is no distension. Palpations: Abdomen is soft. Tenderness: There is no abdominal tenderness. Musculoskeletal:         General: No swelling or tenderness. Cervical back: Normal range of motion. No rigidity. Skin:     General: Skin is warm and dry.    Neurological:      General: No focal deficit present. Mental Status: She is alert. Psychiatric:         Mood and Affect: Mood normal.         Behavior: Behavior normal.         Thought Content: Thought content normal.         Judgment: Judgment normal.         Return in about 11 weeks (around 10/3/2022). An electronic signature was used to authenticate this note.     Kim Fajardo, MDMD    7/24/2022

## 2022-07-24 ASSESSMENT — ENCOUNTER SYMPTOMS
CHEST TIGHTNESS: 0
COUGH: 0
WHEEZING: 0
GASTROINTESTINAL NEGATIVE: 1
EYES NEGATIVE: 1
ALLERGIC/IMMUNOLOGIC NEGATIVE: 1
SHORTNESS OF BREATH: 0

## 2022-09-02 DIAGNOSIS — E78.00 ELEVATED CHOLESTEROL: ICD-10-CM

## 2022-09-02 DIAGNOSIS — E11.9 TYPE 2 DIABETES MELLITUS WITHOUT COMPLICATION, WITHOUT LONG-TERM CURRENT USE OF INSULIN (HCC): ICD-10-CM

## 2022-09-02 DIAGNOSIS — E07.9 THYROID DISEASE: ICD-10-CM

## 2022-09-02 LAB
ALBUMIN SERPL-MCNC: 4.1 G/DL (ref 3.5–5.2)
ALP BLD-CCNC: 112 U/L (ref 35–104)
ALT SERPL-CCNC: 16 U/L (ref 0–32)
ALT SERPL-CCNC: 17 U/L (ref 0–32)
ANION GAP SERPL CALCULATED.3IONS-SCNC: 10 MMOL/L (ref 7–16)
AST SERPL-CCNC: 22 U/L (ref 0–31)
BASOPHILS ABSOLUTE: 0.06 E9/L (ref 0–0.2)
BASOPHILS RELATIVE PERCENT: 1.1 % (ref 0–2)
BILIRUB SERPL-MCNC: 0.5 MG/DL (ref 0–1.2)
BUN BLDV-MCNC: 20 MG/DL (ref 6–23)
CALCIUM SERPL-MCNC: 9.7 MG/DL (ref 8.6–10.2)
CHLORIDE BLD-SCNC: 104 MMOL/L (ref 98–107)
CHOLESTEROL, TOTAL: 169 MG/DL (ref 0–199)
CO2: 25 MMOL/L (ref 22–29)
CREAT SERPL-MCNC: 0.7 MG/DL (ref 0.5–1)
EOSINOPHILS ABSOLUTE: 0.12 E9/L (ref 0.05–0.5)
EOSINOPHILS RELATIVE PERCENT: 2.2 % (ref 0–6)
GFR AFRICAN AMERICAN: >60
GFR NON-AFRICAN AMERICAN: >60 ML/MIN/1.73
GLUCOSE BLD-MCNC: 132 MG/DL (ref 74–99)
HCT VFR BLD CALC: 46.7 % (ref 34–48)
HDLC SERPL-MCNC: 65 MG/DL
HEMOGLOBIN: 15.4 G/DL (ref 11.5–15.5)
IMMATURE GRANULOCYTES #: 0.03 E9/L
IMMATURE GRANULOCYTES %: 0.5 % (ref 0–5)
LDL CHOLESTEROL CALCULATED: 89 MG/DL (ref 0–99)
LYMPHOCYTES ABSOLUTE: 1.83 E9/L (ref 1.5–4)
LYMPHOCYTES RELATIVE PERCENT: 33.1 % (ref 20–42)
MCH RBC QN AUTO: 29.7 PG (ref 26–35)
MCHC RBC AUTO-ENTMCNC: 33 % (ref 32–34.5)
MCV RBC AUTO: 90.2 FL (ref 80–99.9)
MONOCYTES ABSOLUTE: 0.56 E9/L (ref 0.1–0.95)
MONOCYTES RELATIVE PERCENT: 10.1 % (ref 2–12)
NEUTROPHILS ABSOLUTE: 2.93 E9/L (ref 1.8–7.3)
NEUTROPHILS RELATIVE PERCENT: 53 % (ref 43–80)
PDW BLD-RTO: 13 FL (ref 11.5–15)
PLATELET # BLD: 247 E9/L (ref 130–450)
PMV BLD AUTO: 11.2 FL (ref 7–12)
POTASSIUM SERPL-SCNC: 4.3 MMOL/L (ref 3.5–5)
RBC # BLD: 5.18 E12/L (ref 3.5–5.5)
SODIUM BLD-SCNC: 139 MMOL/L (ref 132–146)
TOTAL PROTEIN: 7.4 G/DL (ref 6.4–8.3)
TRIGL SERPL-MCNC: 73 MG/DL (ref 0–149)
TSH SERPL DL<=0.05 MIU/L-ACNC: 4.8 UIU/ML (ref 0.27–4.2)
VLDLC SERPL CALC-MCNC: 15 MG/DL
WBC # BLD: 5.5 E9/L (ref 4.5–11.5)

## 2022-09-06 RX ORDER — LEVOTHYROXINE SODIUM 75 MCG
75 TABLET ORAL DAILY
Qty: 90 TABLET | Refills: 1 | Status: SHIPPED | OUTPATIENT
Start: 2022-09-06

## 2022-10-06 DIAGNOSIS — I10 ESSENTIAL HYPERTENSION: ICD-10-CM

## 2022-10-06 DIAGNOSIS — E07.9 THYROID DISEASE: ICD-10-CM

## 2022-10-06 RX ORDER — IRBESARTAN AND HYDROCHLOROTHIAZIDE 150; 12.5 MG/1; MG/1
TABLET, FILM COATED ORAL
Qty: 90 TABLET | Refills: 1 | Status: SHIPPED | OUTPATIENT
Start: 2022-10-06

## 2022-10-06 RX ORDER — LEVOTHYROXINE SODIUM 50 MCG
TABLET ORAL
Qty: 90 TABLET | Refills: 1 | Status: SHIPPED | OUTPATIENT
Start: 2022-10-06

## 2022-10-06 NOTE — TELEPHONE ENCOUNTER
Medication Refill Request    LOV 7/18/2022  NOV 1/23/2023    Lab Results   Component Value Date    CREATININE 0.7 09/02/2022

## 2022-10-27 LAB — MAMMOGRAPHY, EXTERNAL: NORMAL

## 2023-01-23 ENCOUNTER — OFFICE VISIT (OUTPATIENT)
Dept: FAMILY MEDICINE CLINIC | Age: 68
End: 2023-01-23

## 2023-01-23 VITALS
HEART RATE: 113 BPM | RESPIRATION RATE: 16 BRPM | HEIGHT: 61 IN | OXYGEN SATURATION: 92 % | WEIGHT: 250.4 LBS | DIASTOLIC BLOOD PRESSURE: 70 MMHG | TEMPERATURE: 98.3 F | BODY MASS INDEX: 47.28 KG/M2 | SYSTOLIC BLOOD PRESSURE: 134 MMHG

## 2023-01-23 DIAGNOSIS — E07.9 THYROID DISEASE: ICD-10-CM

## 2023-01-23 DIAGNOSIS — Z00.00 MEDICARE ANNUAL WELLNESS VISIT, SUBSEQUENT: ICD-10-CM

## 2023-01-23 DIAGNOSIS — I10 PRIMARY HYPERTENSION: ICD-10-CM

## 2023-01-23 DIAGNOSIS — I35.0 AORTIC STENOSIS, MODERATE: ICD-10-CM

## 2023-01-23 DIAGNOSIS — E78.00 ELEVATED CHOLESTEROL: ICD-10-CM

## 2023-01-23 DIAGNOSIS — E66.01 CLASS 3 SEVERE OBESITY DUE TO EXCESS CALORIES WITHOUT SERIOUS COMORBIDITY WITH BODY MASS INDEX (BMI) OF 45.0 TO 49.9 IN ADULT (HCC): ICD-10-CM

## 2023-01-23 DIAGNOSIS — E11.9 TYPE 2 DIABETES MELLITUS WITHOUT COMPLICATION, WITHOUT LONG-TERM CURRENT USE OF INSULIN (HCC): Primary | ICD-10-CM

## 2023-01-23 LAB — HBA1C MFR BLD: 6.7 %

## 2023-01-23 SDOH — ECONOMIC STABILITY: INCOME INSECURITY: IN THE LAST 12 MONTHS, WAS THERE A TIME WHEN YOU WERE NOT ABLE TO PAY THE MORTGAGE OR RENT ON TIME?: NO

## 2023-01-23 SDOH — ECONOMIC STABILITY: FOOD INSECURITY: WITHIN THE PAST 12 MONTHS, YOU WORRIED THAT YOUR FOOD WOULD RUN OUT BEFORE YOU GOT MONEY TO BUY MORE.: NEVER TRUE

## 2023-01-23 SDOH — ECONOMIC STABILITY: FOOD INSECURITY: WITHIN THE PAST 12 MONTHS, THE FOOD YOU BOUGHT JUST DIDN'T LAST AND YOU DIDN'T HAVE MONEY TO GET MORE.: NEVER TRUE

## 2023-01-23 SDOH — ECONOMIC STABILITY: HOUSING INSECURITY: IN THE LAST 12 MONTHS, HOW MANY PLACES HAVE YOU LIVED?: 1

## 2023-01-23 ASSESSMENT — PATIENT HEALTH QUESTIONNAIRE - PHQ9
1. LITTLE INTEREST OR PLEASURE IN DOING THINGS: 0
SUM OF ALL RESPONSES TO PHQ9 QUESTIONS 1 & 2: 0
SUM OF ALL RESPONSES TO PHQ QUESTIONS 1-9: 0
2. FEELING DOWN, DEPRESSED OR HOPELESS: 0
SUM OF ALL RESPONSES TO PHQ QUESTIONS 1-9: 0

## 2023-01-23 ASSESSMENT — LIFESTYLE VARIABLES
HOW MANY STANDARD DRINKS CONTAINING ALCOHOL DO YOU HAVE ON A TYPICAL DAY: 1 OR 2
HOW OFTEN DO YOU HAVE A DRINK CONTAINING ALCOHOL: 2-4 TIMES A MONTH

## 2023-01-23 ASSESSMENT — SOCIAL DETERMINANTS OF HEALTH (SDOH): HOW HARD IS IT FOR YOU TO PAY FOR THE VERY BASICS LIKE FOOD, HOUSING, MEDICAL CARE, AND HEATING?: NOT HARD AT ALL

## 2023-01-23 NOTE — PROGRESS NOTES
Medicare Annual Wellness Visit    Karen Hernandez is here for Medicare AWV, Diabetes (6 month check up), and Hypertension    Assessment & Plan   Type 2 diabetes mellitus without complication, without long-term current use of insulin (HCC)  -     POCT glycosylated hemoglobin (Hb A1C)  -     Hemoglobin A1C; Future  Class 3 severe obesity due to excess calories without serious comorbidity with body mass index (BMI) of 45.0 to 49.9 in adult [facility])  Aortic stenosis, moderate  Primary hypertension  -     CBC with Auto Differential; Future  -     Comprehensive Metabolic Panel; Future  Thyroid disease  -     TSH; Future  Elevated cholesterol  -     Lipid Panel; Future    Recommendations for Preventive Services Due: see orders and patient instructions/AVS.  Recommended screening schedule for the next 5-10 years is provided to the patient in written form: see Patient Instructions/AVS.     No follow-ups on file. Subjective   The following acute and/or chronic problems were also addressed today:      Patient's complete Health Risk Assessment and screening values have been reviewed and are found in Flowsheets. The following problems were reviewed today and where indicated follow up appointments were made and/or referrals ordered.     Positive Risk Factor Screenings with Interventions:                 Weight and Activity:  Physical Activity: Insufficiently Active    Days of Exercise per Week: 2 days    Minutes of Exercise per Session: 40 min     On average, how many days per week do you engage in moderate to strenuous exercise (like a brisk walk)?: 2 days  Have you lost any weight without trying in the past 3 months?: No  Body mass index: (!) 47.31  Obesity Interventions:  Patient declines any further evaluation or treatment           Hearing Screen:  Do you or your family notice any trouble with your hearing that hasn't been managed with hearing aids?: (!) Yes    Interventions:  Referred to Audiology                    Wt Readings from Last 3 Encounters:   01/23/23 250 lb 6.4 oz (113.6 kg)   07/18/22 252 lb 12.8 oz (114.7 kg)   09/24/21 249 lb (112.9 kg)         Objective   Vitals:    01/23/23 1546   BP: 134/70   Site: Left Upper Arm   Position: Sitting   Cuff Size: Large Adult   Pulse: (!) 113   Resp: 16   Temp: 98.3 °F (36.8 °C)   TempSrc: Temporal   SpO2: 92%   Weight: 250 lb 6.4 oz (113.6 kg)   Height: 5' 1\" (1.549 m)      Body mass index is 47.31 kg/m². General Appearance: alert and oriented to person, place and time, well developed and well- nourished, in no acute distress  Skin: warm and dry, no rash or erythema  Head: normocephalic and atraumatic  Eyes: pupils equal, round, and reactive to light, extraocular eye movements intact, conjunctivae normal  ENT: tympanic membrane, external ear and ear canal normal bilaterally, nose without deformity, nasal mucosa and turbinates normal without polyps  Neck: supple and non-tender without mass, no thyromegaly or thyroid nodules, no cervical lymphadenopathy  Pulmonary/Chest: clear to auscultation bilaterally- no wheezes, rales or rhonchi, normal air movement, no respiratory distress  Cardiovascular: normal rate, regular rhythm, normal S1 and S2, no murmurs, rubs, clicks, or gallops, distal pulses intact, no carotid bruits  Abdomen: soft, non-tender, non-distended, normal bowel sounds, no masses or organomegaly  Extremities: no cyanosis, clubbing or edema  Musculoskeletal: normal range of motion, no joint swelling, deformity or tenderness  Neurologic: reflexes normal and symmetric, no cranial nerve deficit, gait, coordination and speech normal       No Known Allergies  Prior to Visit Medications    Medication Sig Taking?  Authorizing Provider   irbesartan-hydroCHLOROthiazide (AVALIDE) 150-12.5 MG per tablet TAKE 1 TABLET BY MOUTH EVERY DAY Yes Addis Ndiaye MD   SYNTHROID 75 MCG tablet Take 1 tablet by mouth Daily Yes Addis Ndiaye MD   atorvastatin (LIPITOR) 20 MG tablet Take 20 mg by mouth daily Yes Historical Provider, MD Asencio (Including outside providers/suppliers regularly involved in providing care):   Patient Care Team:  Agus Franco MD as PCP - Michele Terry MD as PCP - [facility] Empaneled Provider     Reviewed and updated this visit:  Tobacco  Allergies  Meds  Problems  Med Hx  Surg Hx  Soc Hx  Fam Hx               Medicare Annual Wellness Visit    Gearline Breeding is here for Medicare AWV, Diabetes (6 month check up), and Hypertension    Assessment & Plan   Type 2 diabetes mellitus without complication, without long-term current use of insulin ([address]  -     POCT glycosylated hemoglobin (Hb A1C)  -     Hemoglobin A1C; Future  Class 3 severe obesity due to excess calories without serious comorbidity with body mass index (BMI) of 45.0 to 49.9 in adult [facility])  Aortic stenosis, moderate  Primary hypertension  -     CBC with Auto Differential; Future  -     Comprehensive Metabolic Panel; Future  Thyroid disease  -     TSH; Future  Elevated cholesterol  -     Lipid Panel; Future  Medicare annual wellness visit, subsequent      Recommendations for Preventive Services Due: see orders and patient instructions/AVS.  Recommended screening schedule for the next 5-10 years is provided to the patient in written form: see Patient Instructions/AVS.     Return in 6 months (on 7/23/2023) for Medicare Annual Wellness Visit in 1 year.      Subjective

## 2023-01-27 NOTE — PATIENT INSTRUCTIONS
Advance Directives: Care Instructions  Overview  An advance directive is a legal way to state your wishes at the end of your life. It tells your family and your doctor what to do if you can't say what you want. There are two main types of advance directives. You can change them any time your wishes change. Living will. This form tells your family and your doctor your wishes about life support and other treatment. The form is also called a declaration. Medical power of . This form lets you name a person to make treatment decisions for you when you can't speak for yourself. This person is called a health care agent (health care proxy, health care surrogate). The form is also called a durable power of  for health care. If you do not have an advance directive, decisions about your medical care may be made by a family member, or by a doctor or a  who doesn't know you. It may help to think of an advance directive as a gift to the people who care for you. If you have one, they won't have to make tough decisions by themselves. For more information, including forms for your state, see the [address] website (www.caringinfo.org/planning/advance-directives/). Follow-up care is a key part of your treatment and safety. Be sure to make and go to all appointments, and call your doctor if you are having problems. It's also a good idea to know your test results and keep a list of the medicines you take. What should you include in an advance directive? Many states have a unique advance directive form. (It may ask you to address specific issues.) Or you might use a universal form that's approved by many states. If your form doesn't tell you what to address, it may be hard to know what to include in your advance directive. Use the questions below to help you get started. Who do you want to make decisions about your medical care if you are not able to?   What life-support measures do you want if you have a serious illness that gets worse over time or can't be cured? What are you most afraid of that might happen? (Maybe you're afraid of having pain, losing your independence, or being kept alive by machines.)  Where would you prefer to die? (Your home? A hospital? A nursing home?)  Do you want to donate your organs when you die? Do you want certain Religion practices performed before you die? When should you call for help? Be sure to contact your doctor if you have any questions. Where can you learn more? Go to http://www.tanner.com/ and enter R264 to learn more about \"Advance Directives: Care Instructions. \"  Current as of: June 16, 2022               Content Version: 13.5  © 1951-7992 Healthwise, Incorporated. Care instructions adapted under license by [facility] ([facility]). If you have questions about a medical condition or this instruction, always ask your healthcare professional. [place] [address] any warranty or liability for your use of this information. Personalized Preventive Plan for Kwame Ba - 1/23/2023  Medicare offers a range of preventive health benefits. Some of the tests and screenings are paid in full while other may be subject to a deductible, co-insurance, and/or copay. Some of these benefits include a comprehensive review of your medical history including lifestyle, illnesses that may run in your family, and various assessments and screenings as appropriate. After reviewing your medical record and screening and assessments performed today your provider may have ordered immunizations, labs, imaging, and/or referrals for you. A list of these orders (if applicable) as well as your Preventive Care list are included within your After Visit Summary for your review.     Other Preventive Recommendations:    A preventive eye exam performed by an eye specialist is recommended every 1-2 years to screen for glaucoma; cataracts, macular degeneration, and other eye disorders. A preventive dental visit is recommended every 6 months. Try to get at least 150 minutes of exercise per week or 10,000 steps per day on a pedometer . Order or download the FREE \"Exercise & Physical Activity: Your Everyday Guide\" from [employer]. Call 2-589.350.5329 or search [employer] online. You need 7471-9938 mg of calcium and 0173-4444 IU of vitamin D per day. It is possible to meet your calcium requirement with diet alone, but a vitamin D supplement is usually necessary to meet this goal.  When exposed to the sun, use a sunscreen that protects against both UVA and UVB radiation with an SPF of 30 or greater. Reapply every 2 to 3 hours or after sweating, drying off with a towel, or swimming. Always wear a seat belt when traveling in a car. Always wear a helmet when riding a bicycle or motorcycle.

## 2023-02-28 RX ORDER — LEVOTHYROXINE SODIUM 75 MCG
TABLET ORAL
Qty: 90 TABLET | Refills: 1 | Status: SHIPPED | OUTPATIENT
Start: 2023-02-28

## 2023-02-28 NOTE — TELEPHONE ENCOUNTER
Medication Refill Request    LOV 1/23/2023  NOV 7/26/2023    Lab Results   Component Value Date    CREATININE 0.7 09/02/2022

## 2023-04-03 DIAGNOSIS — I10 ESSENTIAL HYPERTENSION: ICD-10-CM

## 2023-04-03 RX ORDER — IRBESARTAN AND HYDROCHLOROTHIAZIDE 150; 12.5 MG/1; MG/1
TABLET, FILM COATED ORAL
Qty: 90 TABLET | Refills: 1 | Status: SHIPPED | OUTPATIENT
Start: 2023-04-03

## 2023-04-05 ENCOUNTER — TELEPHONE (OUTPATIENT)
Dept: FAMILY MEDICINE CLINIC | Age: 68
End: 2023-04-05

## 2023-04-05 NOTE — TELEPHONE ENCOUNTER
Pt requesting refill of irbesartan hydrochorothenzide sent to the [facility] in [place]

## 2023-08-28 RX ORDER — LEVOTHYROXINE SODIUM 75 MCG
TABLET ORAL
Qty: 90 TABLET | Refills: 1 | Status: SHIPPED | OUTPATIENT
Start: 2023-08-28

## 2023-08-28 NOTE — TELEPHONE ENCOUNTER
Medication Refill Request    LOV 1/23/2023  NOV 9/6/2023    Lab Results   Component Value Date    CREATININE 0.7 09/02/2022

## 2023-09-06 ENCOUNTER — OFFICE VISIT (OUTPATIENT)
Dept: FAMILY MEDICINE CLINIC | Age: 68
End: 2023-09-06

## 2023-09-06 VITALS
RESPIRATION RATE: 16 BRPM | SYSTOLIC BLOOD PRESSURE: 138 MMHG | OXYGEN SATURATION: 94 % | TEMPERATURE: 97.9 F | DIASTOLIC BLOOD PRESSURE: 80 MMHG | WEIGHT: 251 LBS | BODY MASS INDEX: 47.43 KG/M2 | HEART RATE: 98 BPM

## 2023-09-06 DIAGNOSIS — I35.0 AORTIC STENOSIS, MODERATE: ICD-10-CM

## 2023-09-06 DIAGNOSIS — E07.9 THYROID DISEASE: ICD-10-CM

## 2023-09-06 DIAGNOSIS — M72.0 DUPUYTREN'S CONTRACTURE OF RIGHT HAND: ICD-10-CM

## 2023-09-06 DIAGNOSIS — Z12.31 OTHER SCREENING MAMMOGRAM: Primary | ICD-10-CM

## 2023-09-06 DIAGNOSIS — E66.01 CLASS 3 SEVERE OBESITY DUE TO EXCESS CALORIES WITHOUT SERIOUS COMORBIDITY WITH BODY MASS INDEX (BMI) OF 45.0 TO 49.9 IN ADULT (HCC): ICD-10-CM

## 2023-09-06 DIAGNOSIS — E78.00 ELEVATED CHOLESTEROL: ICD-10-CM

## 2023-09-06 DIAGNOSIS — E11.9 TYPE 2 DIABETES MELLITUS WITHOUT COMPLICATION, WITHOUT LONG-TERM CURRENT USE OF INSULIN (HCC): ICD-10-CM

## 2023-09-06 DIAGNOSIS — I10 PRIMARY HYPERTENSION: ICD-10-CM

## 2023-09-06 DIAGNOSIS — I10 ESSENTIAL HYPERTENSION: ICD-10-CM

## 2023-09-06 LAB
ABSOLUTE IMMATURE GRANULOCYTE: <0.03 K/UL (ref 0–0.58)
ALBUMIN SERPL-MCNC: 4.3 G/DL (ref 3.5–5.2)
ALP BLD-CCNC: 115 U/L (ref 35–104)
ALT SERPL-CCNC: 19 U/L (ref 0–32)
ANION GAP SERPL CALCULATED.3IONS-SCNC: 13 MMOL/L (ref 7–16)
AST SERPL-CCNC: 25 U/L (ref 0–31)
BASOPHILS ABSOLUTE: 0.06 K/UL (ref 0–0.2)
BASOPHILS RELATIVE PERCENT: 1 % (ref 0–2)
BILIRUB SERPL-MCNC: 0.7 MG/DL (ref 0–1.2)
BUN BLDV-MCNC: 14 MG/DL (ref 6–23)
CALCIUM SERPL-MCNC: 9.8 MG/DL (ref 8.6–10.2)
CHLORIDE BLD-SCNC: 97 MMOL/L (ref 98–107)
CHOLESTEROL: 172 MG/DL
CO2: 25 MMOL/L (ref 22–29)
CREAT SERPL-MCNC: 0.7 MG/DL (ref 0.5–1)
EOSINOPHILS ABSOLUTE: 0.05 K/UL (ref 0.05–0.5)
EOSINOPHILS RELATIVE PERCENT: 1 % (ref 0–6)
GFR SERPL CREATININE-BSD FRML MDRD: >60 ML/MIN/1.73M2
GLUCOSE BLD-MCNC: 130 MG/DL (ref 74–99)
HBA1C MFR BLD: 6.9 % (ref 4–5.6)
HCT VFR BLD CALC: 49.2 % (ref 34–48)
HDLC SERPL-MCNC: 75 MG/DL
HEMOGLOBIN: 16.3 G/DL (ref 11.5–15.5)
IMMATURE GRANULOCYTES: 0 % (ref 0–5)
LDL CHOLESTEROL: 84 MG/DL
LYMPHOCYTES ABSOLUTE: 1.7 K/UL (ref 1.5–4)
LYMPHOCYTES RELATIVE PERCENT: 29 % (ref 20–42)
MCH RBC QN AUTO: 29.9 PG (ref 26–35)
MCHC RBC AUTO-ENTMCNC: 33.1 G/DL (ref 32–34.5)
MCV RBC AUTO: 90.1 FL (ref 80–99.9)
MONOCYTES ABSOLUTE: 0.51 K/UL (ref 0.1–0.95)
MONOCYTES RELATIVE PERCENT: 9 % (ref 2–12)
NEUTROPHILS ABSOLUTE: 3.6 K/UL (ref 1.8–7.3)
NEUTROPHILS RELATIVE PERCENT: 61 % (ref 43–80)
PDW BLD-RTO: 13 % (ref 11.5–15)
PLATELET # BLD: 259 K/UL (ref 130–450)
PMV BLD AUTO: 11.4 FL (ref 7–12)
POTASSIUM SERPL-SCNC: 3.9 MMOL/L (ref 3.5–5)
RBC # BLD: 5.46 M/UL (ref 3.5–5.5)
SODIUM BLD-SCNC: 135 MMOL/L (ref 132–146)
TOTAL PROTEIN: 8 G/DL (ref 6.4–8.3)
TRIGL SERPL-MCNC: 67 MG/DL
TSH SERPL DL<=0.05 MIU/L-ACNC: 2.02 UIU/ML (ref 0.27–4.2)
VLDLC SERPL CALC-MCNC: 13 MG/DL
WBC # BLD: 5.9 K/UL (ref 4.5–11.5)

## 2023-09-06 RX ORDER — IRBESARTAN AND HYDROCHLOROTHIAZIDE 150; 12.5 MG/1; MG/1
1 TABLET, FILM COATED ORAL DAILY
Qty: 90 TABLET | Refills: 1 | Status: SHIPPED | OUTPATIENT
Start: 2023-09-06

## 2023-09-06 RX ORDER — ATORVASTATIN CALCIUM 20 MG/1
20 TABLET, FILM COATED ORAL DAILY
Qty: 90 TABLET | Refills: 1 | Status: SHIPPED | OUTPATIENT
Start: 2023-09-06

## 2023-09-09 ASSESSMENT — ENCOUNTER SYMPTOMS
EYES NEGATIVE: 1
GASTROINTESTINAL NEGATIVE: 1
ALLERGIC/IMMUNOLOGIC NEGATIVE: 1
SHORTNESS OF BREATH: 0
CHEST TIGHTNESS: 0
WHEEZING: 0
COUGH: 0

## 2023-10-31 DIAGNOSIS — Z12.31 OTHER SCREENING MAMMOGRAM: ICD-10-CM

## 2024-02-21 RX ORDER — LEVOTHYROXINE SODIUM 75 MCG
TABLET ORAL
Qty: 90 TABLET | Refills: 1 | Status: SHIPPED | OUTPATIENT
Start: 2024-02-21

## 2024-02-21 NOTE — TELEPHONE ENCOUNTER
Medication Refill Request    LOV 9/6/2023  NOV 3/13/2024    Lab Results   Component Value Date    CREATININE 0.7 09/06/2023

## 2024-03-08 DIAGNOSIS — I10 ESSENTIAL HYPERTENSION: ICD-10-CM

## 2024-03-08 RX ORDER — ATORVASTATIN CALCIUM 20 MG/1
20 TABLET, FILM COATED ORAL DAILY
Qty: 90 TABLET | Refills: 1 | Status: SHIPPED | OUTPATIENT
Start: 2024-03-08

## 2024-03-08 NOTE — TELEPHONE ENCOUNTER
Medication Refill Request    LOV 9/6/2023  NOV 5/20/2024    Lab Results   Component Value Date    CREATININE 0.7 09/06/2023

## 2024-03-29 DIAGNOSIS — I10 ESSENTIAL HYPERTENSION: ICD-10-CM

## 2024-03-29 RX ORDER — IRBESARTAN AND HYDROCHLOROTHIAZIDE 150; 12.5 MG/1; MG/1
1 TABLET, FILM COATED ORAL DAILY
Qty: 90 TABLET | Refills: 1 | Status: SHIPPED | OUTPATIENT
Start: 2024-03-29

## 2024-07-08 ENCOUNTER — TELEPHONE (OUTPATIENT)
Dept: FAMILY MEDICINE CLINIC | Age: 69
End: 2024-07-08

## 2024-07-08 NOTE — TELEPHONE ENCOUNTER
----- Message from Chuck Pettit sent at 7/8/2024  9:42 AM EDT -----  Regarding: ECC Appointment Request  ECC Appointment Request    Patient needs appointment for ECC Appointment Type: Annual Visit.    Patient Requested Dates(s):  Patient Requested Time:  Provider Name:    Reason for Appointment Request: Established Patient - No appointments available during search. Patient would like to receive a call back  --------------------------------------------------------------------------------------------------------------------------    Relationship to Patient: Self     Call Back Information: OK to leave message on voicemail  Preferred Call Back Number: Phone [phone]

## 2024-08-18 DIAGNOSIS — I10 ESSENTIAL HYPERTENSION: ICD-10-CM

## 2024-08-19 NOTE — TELEPHONE ENCOUNTER
Medication Refill Request    LOV 9/6/2023  NOV 8/26/2024    Lab Results   Component Value Date    CREATININE 0.7 09/06/2023

## 2024-08-20 RX ORDER — LEVOTHYROXINE SODIUM 75 MCG
TABLET ORAL
Qty: 90 TABLET | Refills: 1 | Status: SHIPPED | OUTPATIENT
Start: 2024-08-20

## 2024-08-20 RX ORDER — IRBESARTAN AND HYDROCHLOROTHIAZIDE 150; 12.5 MG/1; MG/1
1 TABLET, FILM COATED ORAL DAILY
Qty: 90 TABLET | Refills: 1 | Status: SHIPPED | OUTPATIENT
Start: 2024-08-20

## 2024-08-24 SDOH — ECONOMIC STABILITY: INCOME INSECURITY: HOW HARD IS IT FOR YOU TO PAY FOR THE VERY BASICS LIKE FOOD, HOUSING, MEDICAL CARE, AND HEATING?: NOT HARD AT ALL

## 2024-08-24 SDOH — ECONOMIC STABILITY: FOOD INSECURITY: WITHIN THE PAST 12 MONTHS, YOU WORRIED THAT YOUR FOOD WOULD RUN OUT BEFORE YOU GOT MONEY TO BUY MORE.: NEVER TRUE

## 2024-08-24 SDOH — ECONOMIC STABILITY: FOOD INSECURITY: WITHIN THE PAST 12 MONTHS, THE FOOD YOU BOUGHT JUST DIDN'T LAST AND YOU DIDN'T HAVE MONEY TO GET MORE.: NEVER TRUE

## 2024-08-24 SDOH — HEALTH STABILITY: PHYSICAL HEALTH: ON AVERAGE, HOW MANY DAYS PER WEEK DO YOU ENGAGE IN MODERATE TO STRENUOUS EXERCISE (LIKE A BRISK WALK)?: 2 DAYS

## 2024-08-24 ASSESSMENT — PATIENT HEALTH QUESTIONNAIRE - PHQ9
SUM OF ALL RESPONSES TO PHQ QUESTIONS 1-9: 0
2. FEELING DOWN, DEPRESSED OR HOPELESS: NOT AT ALL
SUM OF ALL RESPONSES TO PHQ QUESTIONS 1-9: 0
SUM OF ALL RESPONSES TO PHQ QUESTIONS 1-9: 0
SUM OF ALL RESPONSES TO PHQ9 QUESTIONS 1 & 2: 0
1. LITTLE INTEREST OR PLEASURE IN DOING THINGS: NOT AT ALL
SUM OF ALL RESPONSES TO PHQ QUESTIONS 1-9: 0

## 2024-08-24 ASSESSMENT — LIFESTYLE VARIABLES
HOW MANY STANDARD DRINKS CONTAINING ALCOHOL DO YOU HAVE ON A TYPICAL DAY: 0
HOW OFTEN DO YOU HAVE SIX OR MORE DRINKS ON ONE OCCASION: 1
HOW OFTEN DO YOU HAVE A DRINK CONTAINING ALCOHOL: 2
HOW OFTEN DO YOU HAVE A DRINK CONTAINING ALCOHOL: MONTHLY OR LESS
HOW MANY STANDARD DRINKS CONTAINING ALCOHOL DO YOU HAVE ON A TYPICAL DAY: PATIENT DOES NOT DRINK

## 2024-08-26 ENCOUNTER — OFFICE VISIT (OUTPATIENT)
Dept: FAMILY MEDICINE CLINIC | Age: 69
End: 2024-08-26
Payer: MEDICARE

## 2024-08-26 VITALS
TEMPERATURE: 97.2 F | OXYGEN SATURATION: 99 % | BODY MASS INDEX: 46.59 KG/M2 | DIASTOLIC BLOOD PRESSURE: 72 MMHG | SYSTOLIC BLOOD PRESSURE: 136 MMHG | WEIGHT: 253.2 LBS | HEIGHT: 62 IN | HEART RATE: 64 BPM

## 2024-08-26 DIAGNOSIS — Z00.00 MEDICARE ANNUAL WELLNESS VISIT, SUBSEQUENT: Primary | ICD-10-CM

## 2024-08-26 DIAGNOSIS — E07.9 THYROID DISEASE: ICD-10-CM

## 2024-08-26 DIAGNOSIS — R26.2 DIFFICULTY WALKING: ICD-10-CM

## 2024-08-26 DIAGNOSIS — I35.0 AORTIC STENOSIS, MODERATE: ICD-10-CM

## 2024-08-26 DIAGNOSIS — E78.00 ELEVATED CHOLESTEROL: ICD-10-CM

## 2024-08-26 DIAGNOSIS — E11.9 TYPE 2 DIABETES MELLITUS WITHOUT COMPLICATION, WITHOUT LONG-TERM CURRENT USE OF INSULIN (HCC): ICD-10-CM

## 2024-08-26 DIAGNOSIS — I10 PRIMARY HYPERTENSION: ICD-10-CM

## 2024-08-26 DIAGNOSIS — E66.01 CLASS 3 SEVERE OBESITY DUE TO EXCESS CALORIES WITHOUT SERIOUS COMORBIDITY WITH BODY MASS INDEX (BMI) OF 45.0 TO 49.9 IN ADULT (HCC): ICD-10-CM

## 2024-08-26 DIAGNOSIS — Z12.31 OTHER SCREENING MAMMOGRAM: ICD-10-CM

## 2024-08-26 DIAGNOSIS — Z78.0 ASYMPTOMATIC MENOPAUSAL STATE: ICD-10-CM

## 2024-08-26 LAB
ALBUMIN: 4.5 G/DL (ref 3.5–5.2)
ALP BLD-CCNC: 114 U/L (ref 35–104)
ALT SERPL-CCNC: 19 U/L (ref 0–32)
ANION GAP SERPL CALCULATED.3IONS-SCNC: 11 MMOL/L (ref 7–16)
AST SERPL-CCNC: 20 U/L (ref 0–31)
BASOPHILS ABSOLUTE: 0.06 K/UL (ref 0–0.2)
BASOPHILS RELATIVE PERCENT: 1 % (ref 0–2)
BILIRUB SERPL-MCNC: 0.7 MG/DL (ref 0–1.2)
BUN BLDV-MCNC: 21 MG/DL (ref 6–23)
CALCIUM SERPL-MCNC: 9.7 MG/DL (ref 8.6–10.2)
CHLORIDE BLD-SCNC: 99 MMOL/L (ref 98–107)
CHOLESTEROL, TOTAL: 174 MG/DL
CO2: 26 MMOL/L (ref 22–29)
CREAT SERPL-MCNC: 0.6 MG/DL (ref 0.5–1)
EOSINOPHILS ABSOLUTE: 0.1 K/UL (ref 0.05–0.5)
EOSINOPHILS RELATIVE PERCENT: 2 % (ref 0–6)
GFR, ESTIMATED: >90 ML/MIN/1.73M2
GLUCOSE BLD-MCNC: 163 MG/DL (ref 74–99)
HBA1C MFR BLD: 7 %
HCT VFR BLD CALC: 47.8 % (ref 34–48)
HDLC SERPL-MCNC: 76 MG/DL
HEMOGLOBIN: 15.8 G/DL (ref 11.5–15.5)
IMMATURE GRANULOCYTES %: 1 % (ref 0–5)
IMMATURE GRANULOCYTES ABSOLUTE: 0.03 K/UL (ref 0–0.58)
LDL CHOLESTEROL: 86 MG/DL
LYMPHOCYTES ABSOLUTE: 1.42 K/UL (ref 1.5–4)
LYMPHOCYTES RELATIVE PERCENT: 21 % (ref 20–42)
MCH RBC QN AUTO: 30.2 PG (ref 26–35)
MCHC RBC AUTO-ENTMCNC: 33.1 G/DL (ref 32–34.5)
MCV RBC AUTO: 91.2 FL (ref 80–99.9)
MONOCYTES ABSOLUTE: 0.49 K/UL (ref 0.1–0.95)
MONOCYTES RELATIVE PERCENT: 7 % (ref 2–12)
NEUTROPHILS ABSOLUTE: 4.55 K/UL (ref 1.8–7.3)
NEUTROPHILS RELATIVE PERCENT: 68 % (ref 43–80)
PDW BLD-RTO: 13.2 % (ref 11.5–15)
PLATELET # BLD: 254 K/UL (ref 130–450)
PMV BLD AUTO: 11.3 FL (ref 7–12)
POTASSIUM SERPL-SCNC: 4.5 MMOL/L (ref 3.5–5)
RBC # BLD: 5.24 M/UL (ref 3.5–5.5)
SODIUM BLD-SCNC: 136 MMOL/L (ref 132–146)
TOTAL PROTEIN: 7.7 G/DL (ref 6.4–8.3)
TRIGL SERPL-MCNC: 61 MG/DL
TSH SERPL DL<=0.05 MIU/L-ACNC: 3.53 UIU/ML (ref 0.27–4.2)
VLDLC SERPL CALC-MCNC: 12 MG/DL
WBC # BLD: 6.7 K/UL (ref 4.5–11.5)

## 2024-08-26 PROCEDURE — 3078F DIAST BP <80 MM HG: CPT | Performed by: FAMILY MEDICINE

## 2024-08-26 PROCEDURE — G0439 PPPS, SUBSEQ VISIT: HCPCS | Performed by: FAMILY MEDICINE

## 2024-08-26 PROCEDURE — 3017F COLORECTAL CA SCREEN DOC REV: CPT | Performed by: FAMILY MEDICINE

## 2024-08-26 PROCEDURE — 1123F ACP DISCUSS/DSCN MKR DOCD: CPT | Performed by: FAMILY MEDICINE

## 2024-08-26 PROCEDURE — 83036 HEMOGLOBIN GLYCOSYLATED A1C: CPT | Performed by: FAMILY MEDICINE

## 2024-08-26 PROCEDURE — 3051F HG A1C>EQUAL 7.0%<8.0%: CPT | Performed by: FAMILY MEDICINE

## 2024-08-26 PROCEDURE — 3075F SYST BP GE 130 - 139MM HG: CPT | Performed by: FAMILY MEDICINE

## 2024-08-26 RX ORDER — METFORMIN HCL 500 MG
500 TABLET, EXTENDED RELEASE 24 HR ORAL
Qty: 90 TABLET | Refills: 1 | Status: SHIPPED | OUTPATIENT
Start: 2024-08-26

## 2024-08-26 NOTE — PROGRESS NOTES
Medicare Annual Wellness Visit    Fina Sigala is here for Medicare AWV, Diabetes, and Hypertension    Assessment & Plan   Type 2 diabetes mellitus without complication, without long-term current use of insulin ([facility])  Hemoglobin A1C   Date Value Ref Range Status   08/26/2024 7.0 % Final     On metformin .  Stable, cont meds recheck 6 mos    -     POCT glycosylated hemoglobin (Hb A1C)  Primary hypertension.vital  Vitals:    08/26/24 0756   BP: 136/72   Pulse: 64   Temp: 97.2 °F (36.2 °C)   SpO2: 99%   On avalide  Stable, cont meds recheck 6 mos      Body mass index (BMI) 45.0-49.9, adult ([facility])  Wt Readings from Last 3 Encounters:   08/26/24 114.9 kg (253 lb 3.2 oz)   09/06/23 113.9 kg (251 lb)   01/23/23 113.6 kg (250 lb 6.4 oz)     Encourage diet and exercise  Not really motivated to do so    Weight - Scale: 114.9 kg (253 lb 3.2 oz)   Wt Readings from Last 3 Encounters:   08/26/24 114.9 kg (253 lb 3.2 oz)   09/06/23 113.9 kg (251 lb)   01/23/23 113.6 kg (250 lb 6.4 oz)       Class 3 severe obesity due to excess calories without serious comorbidity with body mass index (BMI) of 45.0 to 49.9 in adult ([facility])  Thyroid disease  Aortic stenosis, moderate  Difficulty walking  AORTIC STENOSIS. Echo 1/24 moderately severe AS- difficult to determine anatomy (ANN 0.92 cm2, pk/mn 74/35 mmHg, DI 0.29), normal LV systolic function, EF 61%. Overall cardiac status is stable. Reviewed with patient and  management of AS, role for AVR, etc. Continue to monitor for symptoms otherwise plan to follow up in ~6 months with echo.   Recommendations for Preventive Services Due: see orders and patient instructions/AVS.  Recommended screening schedule for the next 5-10 years is provided to the patient in written form: see Patient Instructions/AVS.     No follow-ups on file.     Subjective       Patient's complete Health Risk Assessment and screening values have been reviewed and are found in Flowsheets. The following problems were

## 2024-09-10 ENCOUNTER — TELEPHONE (OUTPATIENT)
Dept: BARIATRICS/WEIGHT MGMT | Age: 69
End: 2024-09-10

## 2024-10-11 DIAGNOSIS — I10 ESSENTIAL HYPERTENSION: ICD-10-CM

## 2024-10-11 NOTE — TELEPHONE ENCOUNTER
Medication Refill Request    LOV 8/26/2024  NOV 3/3/2025    Lab Results   Component Value Date    CREATININE 0.6 08/26/2024

## 2024-10-12 RX ORDER — ATORVASTATIN CALCIUM 20 MG/1
20 TABLET, FILM COATED ORAL DAILY
Qty: 90 TABLET | Refills: 1 | Status: SHIPPED | OUTPATIENT
Start: 2024-10-12

## 2024-10-22 ENCOUNTER — OFFICE VISIT (OUTPATIENT)
Dept: BARIATRICS/WEIGHT MGMT | Age: 69
End: 2024-10-22
Payer: MEDICARE

## 2024-10-22 VITALS
DIASTOLIC BLOOD PRESSURE: 67 MMHG | WEIGHT: 254.2 LBS | HEART RATE: 89 BPM | BODY MASS INDEX: 46.78 KG/M2 | SYSTOLIC BLOOD PRESSURE: 162 MMHG | HEIGHT: 62 IN | TEMPERATURE: 97.9 F

## 2024-10-22 DIAGNOSIS — I10 PRIMARY HYPERTENSION: ICD-10-CM

## 2024-10-22 DIAGNOSIS — E66.01 CLASS 3 SEVERE OBESITY DUE TO EXCESS CALORIES WITHOUT SERIOUS COMORBIDITY WITH BODY MASS INDEX (BMI) OF 45.0 TO 49.9 IN ADULT: ICD-10-CM

## 2024-10-22 DIAGNOSIS — E11.9 TYPE 2 DIABETES MELLITUS WITHOUT COMPLICATION, WITHOUT LONG-TERM CURRENT USE OF INSULIN (HCC): Primary | ICD-10-CM

## 2024-10-22 DIAGNOSIS — E66.813 CLASS 3 SEVERE OBESITY DUE TO EXCESS CALORIES WITHOUT SERIOUS COMORBIDITY WITH BODY MASS INDEX (BMI) OF 45.0 TO 49.9 IN ADULT: ICD-10-CM

## 2024-10-22 PROCEDURE — 3077F SYST BP >= 140 MM HG: CPT | Performed by: INTERNAL MEDICINE

## 2024-10-22 PROCEDURE — 2022F DILAT RTA XM EVC RTNOPTHY: CPT | Performed by: INTERNAL MEDICINE

## 2024-10-22 PROCEDURE — G2212 PROLONG OUTPT/OFFICE VIS: HCPCS | Performed by: INTERNAL MEDICINE

## 2024-10-22 PROCEDURE — G8400 PT W/DXA NO RESULTS DOC: HCPCS | Performed by: INTERNAL MEDICINE

## 2024-10-22 PROCEDURE — 3017F COLORECTAL CA SCREEN DOC REV: CPT | Performed by: INTERNAL MEDICINE

## 2024-10-22 PROCEDURE — 1036F TOBACCO NON-USER: CPT | Performed by: INTERNAL MEDICINE

## 2024-10-22 PROCEDURE — 1090F PRES/ABSN URINE INCON ASSESS: CPT | Performed by: INTERNAL MEDICINE

## 2024-10-22 PROCEDURE — 99205 OFFICE O/P NEW HI 60 MIN: CPT | Performed by: INTERNAL MEDICINE

## 2024-10-22 PROCEDURE — 1123F ACP DISCUSS/DSCN MKR DOCD: CPT | Performed by: INTERNAL MEDICINE

## 2024-10-22 PROCEDURE — 99202 OFFICE O/P NEW SF 15 MIN: CPT | Performed by: INTERNAL MEDICINE

## 2024-10-22 PROCEDURE — 3051F HG A1C>EQUAL 7.0%<8.0%: CPT | Performed by: INTERNAL MEDICINE

## 2024-10-22 PROCEDURE — G8417 CALC BMI ABV UP PARAM F/U: HCPCS | Performed by: INTERNAL MEDICINE

## 2024-10-22 PROCEDURE — 3078F DIAST BP <80 MM HG: CPT | Performed by: INTERNAL MEDICINE

## 2024-10-22 PROCEDURE — G8484 FLU IMMUNIZE NO ADMIN: HCPCS | Performed by: INTERNAL MEDICINE

## 2024-10-22 PROCEDURE — G8428 CUR MEDS NOT DOCUMENT: HCPCS | Performed by: INTERNAL MEDICINE

## 2024-10-22 RX ORDER — BLOOD SUGAR DIAGNOSTIC
1 STRIP MISCELLANEOUS DAILY
Qty: 100 EACH | Refills: 3 | Status: SHIPPED | OUTPATIENT
Start: 2024-10-22

## 2024-10-22 RX ORDER — SEMAGLUTIDE 0.68 MG/ML
INJECTION, SOLUTION SUBCUTANEOUS
Qty: 6 ML | Refills: 3 | Status: SHIPPED | OUTPATIENT
Start: 2024-10-22

## 2024-10-22 RX ORDER — LANCETS
1 EACH MISCELLANEOUS DAILY
Qty: 100 EACH | Refills: 3 | Status: SHIPPED | OUTPATIENT
Start: 2024-10-22

## 2024-10-22 RX ORDER — BLOOD-GLUCOSE METER
EACH MISCELLANEOUS
Qty: 1 KIT | Refills: 0 | Status: SHIPPED | OUTPATIENT
Start: 2024-10-22

## 2024-10-22 NOTE — PATIENT INSTRUCTIONS
Rules:  Count calories every day    Requirements:  Make sure protein intake is at least 70 grams per day (do not count protein every day; instead spot check your intake every 2-3 weeks and make sure what you think you are getting is close to accurate; consider using a protein shake if needed; these are in the pharmacy section of the stores, not the grocery section; Premier, Pure Protein and Fairlife are relatively inexpensive and taste good to most patients; other options are Nectar, Boost Max, Ensure Max, BeneProtein and GNC lean (which is lactose-free);   Nectar fruit, Premier Protein Clear, IsoPure Protein Drink, and Protein 2 O are water-based options; Quest (or Cosco, which is cheaper and is ordered on Amazon) and the [place] protein bars can also be used, but have less protein in them )  (Disclaimer: Dietary supplements rarely have their listed ingredients and the amount of each verified by a third party other. Sometimes they give verification for their claims to be GMO and gluten free and to be organic. However, even such verifications as these may still be untrustworthy.)  Make sure fiber intake is at least 22 grams/day. Do this in part or whole by taking 12 tablespoons of [employer]'s Fiber One original, plain cereal (or [place]'s All Bran Buds cereal) or 4 tablespoons of wheat dextrin powder (Benefiber or generic brand). For both of these, start with 1/8th - 1/4th the target amount and every week add another 1/8th - 1/4th until reaching the target).  Also, fiber gummies containing inulin (such as Nature Made, Tobias, Benefiber) or Fiber Choice Pre-biotic tablets containing inulin are options. 1 cup of beans (not green beans, barbeque beans, baked beans or pork and beans) or peas is an excellent food source of fiber. Low calorie, high fiber bread products containing modified wheat starch can be used. An example is the [employer] Xtreme Wellness High Fiber Carb Lean tortilla (7grams in 30

## 2024-10-22 NOTE — PROGRESS NOTES
CC -   HTN, Obesity    BACKGROUND -   First visit: 10/22/24    Obesity   Began in late 30's  Initial BMI 46.5, Wt 254.2 lbs, Ht 5' 2\"  HS Grad wt 140 lbs   Lowest   wt 120 lbs   Highest  wt 254 lbs  Pattern of wt gain: grad  Wt change past yr: up 2 lbs  Most wt lost: 20 lbs (Dr. Tomas)  Other diets attempted: WW    Desire to lose weight: 10/10  Problem posed by appetite: 7/10    Initial Diet:    Number of meals per day - 2    Number of snacks per day - 1    Meal volume - 7\" plate, sometimes seconds    Fast food/convenience store - 1x/week    Restaurants (not fast food) - 2x/week   Sweets - 7d/week (1-2 biscotti's (110 Berto each) 5d/wk, one baked dessert (150 Berto))   Chips - 0d/week   Crackers/pretzels - 2-3d/week (one handful pretzels as L)   Nuts - 2-3d/week (one handful with pretzels for L)   Peanut Butter - 0d/week   Popcorn - 0-1d/week   Dried fruit - 7d/week (2 prunes (only for two months), also 1 large pinch raisins 1d))   Whole fruit - 1d/week (1 serving)   Breakfast cereal - 0d/week   Granola/Fruit/Energy bar - 0d/week   Sugar sweetened beverages - 2 cups coffee/d w/ 8 Tbsp Coffee Mate zero sugar Italian sweet cream (15 Berto each), 10 oz 2% milk/d   Protein - No supplements   Fiber - No supplements     Exercise:    Gym membership - [facility]    Walking - none    Running - none    Resistance - none    Aerobic (water) - 2 hrs 1d/wk + 1 hr 2d/wk  ______________________      PFSH -  Past Medical History:   Diagnosis Date    Aortic stenosis     HLD (hyperlipidemia)     Hypertension     Hypothyroidism     Obesity      Current Outpatient Medications   Medication Sig Dispense Refill    atorvastatin (LIPITOR) 20 MG tablet TAKE 1 TABLET BY MOUTH EVERY DAY 90 tablet 1    metFORMIN (GLUCOPHAGE-XR) 500 MG extended release tablet Take 1 tablet by mouth daily (with breakfast) 90 tablet 1    irbesartan-hydroCHLOROthiazide (AVALIDE) 150-12.5 MG per tablet TAKE 1 TABLET BY MOUTH EVERY DAY 90 tablet 1    SYNTHROID

## 2024-11-18 ENCOUNTER — INITIAL CONSULT (OUTPATIENT)
Dept: BARIATRICS/WEIGHT MGMT | Age: 69
End: 2024-11-18
Payer: MEDICARE

## 2024-11-18 VITALS — HEIGHT: 62 IN | BODY MASS INDEX: 46.74 KG/M2 | WEIGHT: 254 LBS

## 2024-11-18 DIAGNOSIS — E66.01 MORBID OBESITY DUE TO EXCESS CALORIES: Primary | ICD-10-CM

## 2024-11-18 DIAGNOSIS — Z71.3 DIETARY COUNSELING: ICD-10-CM

## 2024-11-18 DIAGNOSIS — Z12.31 OTHER SCREENING MAMMOGRAM: ICD-10-CM

## 2024-11-18 PROCEDURE — G8417 CALC BMI ABV UP PARAM F/U: HCPCS | Performed by: DIETITIAN, REGISTERED

## 2024-11-18 PROCEDURE — G8428 CUR MEDS NOT DOCUMENT: HCPCS | Performed by: DIETITIAN, REGISTERED

## 2024-11-18 PROCEDURE — 97802 MEDICAL NUTRITION INDIV IN: CPT | Performed by: DIETITIAN, REGISTERED

## 2024-11-18 NOTE — PROGRESS NOTES
Medical Nutrition Therapy - Initial     Provided initial weight loss diet counseling to pt.  Pt weighed 254 lbs today, indicating a loss of 0.2 lbs since beginning weight loss plan (254.2 lbs on 10/22/24).  Plan is as follows, per Dr Carranza:    Rules:  Count calories every day     Requirements:  Make sure protein intake is at least 70 grams per day (do not count protein every day; instead spot check your intake every 2-3 weeks and make sure what you think you are getting is close to accurate; consider using a protein shake if needed; these are in the pharmacy section of the stores, not the grocery section; Premier, Pure Protein and Fairlife are relatively inexpensive and taste good to most patients; other options are Nectar, Boost Max, Ensure Max, BeneProtein and GNC lean (which is lactose-free);   Nectar fruit, Premier Protein Clear, IsoPure Protein Drink, and Protein 2 O are water-based options; Quest (or Cosco, which is cheaper and is ordered on Amazon) and the [employer] protein bars can also be used, but have less protein in them )  (Disclaimer: Dietary supplements rarely have their listed ingredients and the amount of each verified by a third party other. Sometimes they give verification for their claims to be GMO and gluten free and to be organic. However, even such verifications as these may still be untrustworthy.)  Make sure fiber intake is at least 22 grams/day. Do this in part or whole by taking 12 tablespoons of [employer]'s Fiber One original, plain cereal (or [place] All Bran Buds cereal) or 4 tablespoons of wheat dextrin powder (Benefiber or generic brand). For both of these, start with 1/8th - 1/4th the target amount and every week add another 1/8th - 1/4th until reaching the target).  Also, fiber gummies containing inulin (such as Nature Made, [place], Benefiber) or Fiber Choice Pre-biotic tablets containing inulin are

## 2024-11-18 NOTE — PATIENT INSTRUCTIONS
Tips/Suggestions:    Focus first and foremost on the following elements of your plan:  Count all calories you take in, every day  Limit to 1300 calories per day    As you get these items under control, strive to comply with all elements that are part of your weight loss plan.    Other important items:  Consider Peanut Butter Powder or PB2 brand - much lower calories and good peanut butter flavor.    Consider Miracle Noodle products for a pasta alternative, or Banza or other legume based pastas  Increase protein to 70 grams per day  Increase your daily fiber intake - consider Benefiber or FiberWell Gummies by [employer]  Walk or do exercise YOU enjoy  - 30 minutes per day    Contact:  Jose A Silva RD, LD   [phone]

## 2025-01-03 DIAGNOSIS — E11.9 TYPE 2 DIABETES MELLITUS WITHOUT COMPLICATION, WITHOUT LONG-TERM CURRENT USE OF INSULIN (HCC): ICD-10-CM

## 2025-01-06 RX ORDER — BLOOD-GLUCOSE METER
EACH MISCELLANEOUS
Qty: 1 KIT | Refills: 0 | Status: SHIPPED | OUTPATIENT
Start: 2025-01-06

## 2025-02-06 RX ORDER — LEVOTHYROXINE SODIUM 75 MCG
TABLET ORAL
Qty: 90 TABLET | Refills: 1 | Status: SHIPPED | OUTPATIENT
Start: 2025-02-06

## 2025-02-06 NOTE — TELEPHONE ENCOUNTER
Name of Medication(s) Requested:  Requested Prescriptions     Pending Prescriptions Disp Refills    SYNTHROID 75 MCG tablet [Pharmacy Med Name: SYNTHROID 75 MCG TABLET] 90 tablet 1     Sig: TAKE 1 TABLET BY MOUTH EVERY DAY       Medication is on current medication list Yes    Dosage and directions were verified? Yes    Quantity verified: 90 day supply     Pharmacy Verified?  Yes    Last Appointment:  8/26/2024    Future appts:  Future Appointments   Date Time Provider Department Center   3/3/2025  9:00 AM Miri Barcenas MD [facility]   3/21/2025  1:30 PM Luis Manuel Carranza MD Surg Weight [facility]        (If no appt send self scheduling link. .REFILLAPPT)  Scheduling request sent?     [] Yes  [x] No    Does patient need updated?  [] Yes  [x] No

## 2025-02-13 ENCOUNTER — TELEPHONE (OUTPATIENT)
Dept: FAMILY MEDICINE CLINIC | Age: 70
End: 2025-02-13

## 2025-02-13 NOTE — TELEPHONE ENCOUNTER
Good Afternoon. I received fax from [employer]  Stating that [facility] has provided a temporary supply of synthroid 7mcg . They state that the drug is either not included on the formulary but subjected to certain limits. They will not continue to pay for the drug  after the maxium of 30 days temp supply that they are required to cover unless you obtain a formulary excepion rom them

## 2025-02-28 DIAGNOSIS — E11.9 TYPE 2 DIABETES MELLITUS WITHOUT COMPLICATION, WITHOUT LONG-TERM CURRENT USE OF INSULIN (HCC): ICD-10-CM

## 2025-02-28 DIAGNOSIS — E07.9 THYROID DISEASE: ICD-10-CM

## 2025-02-28 DIAGNOSIS — I10 ESSENTIAL HYPERTENSION: ICD-10-CM

## 2025-02-28 DIAGNOSIS — E78.00 ELEVATED CHOLESTEROL: ICD-10-CM

## 2025-02-28 LAB
ALBUMIN: 4.1 G/DL (ref 3.5–5.2)
ALP BLD-CCNC: 105 U/L (ref 35–104)
ALT SERPL-CCNC: 17 U/L (ref 0–32)
ANION GAP SERPL CALCULATED.3IONS-SCNC: 15 MMOL/L (ref 7–16)
AST SERPL-CCNC: 19 U/L (ref 0–31)
BASOPHILS ABSOLUTE: 0.07 K/UL (ref 0–0.2)
BASOPHILS RELATIVE PERCENT: 1 % (ref 0–2)
BILIRUB SERPL-MCNC: 0.7 MG/DL (ref 0–1.2)
BUN BLDV-MCNC: 18 MG/DL (ref 6–23)
CALCIUM SERPL-MCNC: 9.7 MG/DL (ref 8.6–10.2)
CHLORIDE BLD-SCNC: 97 MMOL/L (ref 98–107)
CHOLESTEROL, TOTAL: 170 MG/DL
CO2: 25 MMOL/L (ref 22–29)
CREAT SERPL-MCNC: 0.7 MG/DL (ref 0.5–1)
EOSINOPHILS ABSOLUTE: 0.08 K/UL (ref 0.05–0.5)
EOSINOPHILS RELATIVE PERCENT: 1 % (ref 0–6)
GFR, ESTIMATED: >90 ML/MIN/1.73M2
GLUCOSE BLD-MCNC: 131 MG/DL (ref 74–99)
HBA1C MFR BLD: 6.3 % (ref 4–5.6)
HCT VFR BLD CALC: 46.6 % (ref 34–48)
HDLC SERPL-MCNC: 66 MG/DL
HEMOGLOBIN: 15.3 G/DL (ref 11.5–15.5)
IMMATURE GRANULOCYTES %: 0 % (ref 0–5)
IMMATURE GRANULOCYTES ABSOLUTE: <0.03 K/UL (ref 0–0.58)
LDL CHOLESTEROL: 91 MG/DL
LYMPHOCYTES ABSOLUTE: 1.59 K/UL (ref 1.5–4)
LYMPHOCYTES RELATIVE PERCENT: 27 % (ref 20–42)
MCH RBC QN AUTO: 29.9 PG (ref 26–35)
MCHC RBC AUTO-ENTMCNC: 32.8 G/DL (ref 32–34.5)
MCV RBC AUTO: 91 FL (ref 80–99.9)
MONOCYTES ABSOLUTE: 0.58 K/UL (ref 0.1–0.95)
MONOCYTES RELATIVE PERCENT: 10 % (ref 2–12)
NEUTROPHILS ABSOLUTE: 3.59 K/UL (ref 1.8–7.3)
NEUTROPHILS RELATIVE PERCENT: 61 % (ref 43–80)
PDW BLD-RTO: 13.2 % (ref 11.5–15)
PLATELET # BLD: 257 K/UL (ref 130–450)
PMV BLD AUTO: 11.2 FL (ref 7–12)
POTASSIUM SERPL-SCNC: 4.2 MMOL/L (ref 3.5–5)
RBC # BLD: 5.12 M/UL (ref 3.5–5.5)
SODIUM BLD-SCNC: 137 MMOL/L (ref 132–146)
TOTAL PROTEIN: 7.3 G/DL (ref 6.4–8.3)
TRIGL SERPL-MCNC: 66 MG/DL
TSH SERPL DL<=0.05 MIU/L-ACNC: 2.84 UIU/ML (ref 0.27–4.2)
VLDLC SERPL CALC-MCNC: 13 MG/DL
WBC # BLD: 5.9 K/UL (ref 4.5–11.5)

## 2025-03-03 ENCOUNTER — OFFICE VISIT (OUTPATIENT)
Dept: FAMILY MEDICINE CLINIC | Age: 70
End: 2025-03-03
Payer: MEDICARE

## 2025-03-03 VITALS
TEMPERATURE: 98 F | WEIGHT: 236.2 LBS | SYSTOLIC BLOOD PRESSURE: 126 MMHG | HEART RATE: 78 BPM | DIASTOLIC BLOOD PRESSURE: 64 MMHG | BODY MASS INDEX: 43.2 KG/M2 | OXYGEN SATURATION: 96 %

## 2025-03-03 DIAGNOSIS — E03.9 ACQUIRED HYPOTHYROIDISM: ICD-10-CM

## 2025-03-03 DIAGNOSIS — E78.5 HYPERLIPIDEMIA, UNSPECIFIED HYPERLIPIDEMIA TYPE: ICD-10-CM

## 2025-03-03 DIAGNOSIS — Z78.0 ASYMPTOMATIC MENOPAUSAL STATE: Primary | ICD-10-CM

## 2025-03-03 DIAGNOSIS — I10 PRIMARY HYPERTENSION: ICD-10-CM

## 2025-03-03 DIAGNOSIS — I35.0 AORTIC STENOSIS, MODERATE: ICD-10-CM

## 2025-03-03 DIAGNOSIS — E66.813 CLASS 3 SEVERE OBESITY DUE TO EXCESS CALORIES WITHOUT SERIOUS COMORBIDITY WITH BODY MASS INDEX (BMI) OF 45.0 TO 49.9 IN ADULT: ICD-10-CM

## 2025-03-03 DIAGNOSIS — E11.9 TYPE 2 DIABETES MELLITUS WITHOUT COMPLICATION, WITHOUT LONG-TERM CURRENT USE OF INSULIN (HCC): ICD-10-CM

## 2025-03-03 DIAGNOSIS — E66.01 CLASS 3 SEVERE OBESITY DUE TO EXCESS CALORIES WITHOUT SERIOUS COMORBIDITY WITH BODY MASS INDEX (BMI) OF 45.0 TO 49.9 IN ADULT: ICD-10-CM

## 2025-03-03 LAB
CREATININE URINE: 26.3 MG/DL (ref 29–226)
MICROALBUMIN/CREAT 24H UR: <12 MG/L (ref 0–19)
MICROALBUMIN/CREAT UR-RTO: ABNORMAL MCG/MG CREAT (ref 0–30)

## 2025-03-03 PROCEDURE — 3078F DIAST BP <80 MM HG: CPT | Performed by: FAMILY MEDICINE

## 2025-03-03 PROCEDURE — 1159F MED LIST DOCD IN RCRD: CPT | Performed by: FAMILY MEDICINE

## 2025-03-03 PROCEDURE — 2022F DILAT RTA XM EVC RTNOPTHY: CPT | Performed by: FAMILY MEDICINE

## 2025-03-03 PROCEDURE — 99214 OFFICE O/P EST MOD 30 MIN: CPT | Performed by: FAMILY MEDICINE

## 2025-03-03 PROCEDURE — G8400 PT W/DXA NO RESULTS DOC: HCPCS | Performed by: FAMILY MEDICINE

## 2025-03-03 PROCEDURE — 3017F COLORECTAL CA SCREEN DOC REV: CPT | Performed by: FAMILY MEDICINE

## 2025-03-03 PROCEDURE — 1160F RVW MEDS BY RX/DR IN RCRD: CPT | Performed by: FAMILY MEDICINE

## 2025-03-03 PROCEDURE — 3074F SYST BP LT 130 MM HG: CPT | Performed by: FAMILY MEDICINE

## 2025-03-03 PROCEDURE — G8417 CALC BMI ABV UP PARAM F/U: HCPCS | Performed by: FAMILY MEDICINE

## 2025-03-03 PROCEDURE — 1090F PRES/ABSN URINE INCON ASSESS: CPT | Performed by: FAMILY MEDICINE

## 2025-03-03 PROCEDURE — 1036F TOBACCO NON-USER: CPT | Performed by: FAMILY MEDICINE

## 2025-03-03 PROCEDURE — G8427 DOCREV CUR MEDS BY ELIG CLIN: HCPCS | Performed by: FAMILY MEDICINE

## 2025-03-03 PROCEDURE — 3044F HG A1C LEVEL LT 7.0%: CPT | Performed by: FAMILY MEDICINE

## 2025-03-03 PROCEDURE — 1123F ACP DISCUSS/DSCN MKR DOCD: CPT | Performed by: FAMILY MEDICINE

## 2025-03-03 SDOH — ECONOMIC STABILITY: FOOD INSECURITY: WITHIN THE PAST 12 MONTHS, THE FOOD YOU BOUGHT JUST DIDN'T LAST AND YOU DIDN'T HAVE MONEY TO GET MORE.: NEVER TRUE

## 2025-03-03 SDOH — ECONOMIC STABILITY: FOOD INSECURITY: WITHIN THE PAST 12 MONTHS, YOU WORRIED THAT YOUR FOOD WOULD RUN OUT BEFORE YOU GOT MONEY TO BUY MORE.: NEVER TRUE

## 2025-03-03 ASSESSMENT — PATIENT HEALTH QUESTIONNAIRE - PHQ9
2. FEELING DOWN, DEPRESSED OR HOPELESS: NOT AT ALL
SUM OF ALL RESPONSES TO PHQ QUESTIONS 1-9: 0
SUM OF ALL RESPONSES TO PHQ QUESTIONS 1-9: 0
1. LITTLE INTEREST OR PLEASURE IN DOING THINGS: NOT AT ALL
SUM OF ALL RESPONSES TO PHQ QUESTIONS 1-9: 0
SUM OF ALL RESPONSES TO PHQ QUESTIONS 1-9: 0

## 2025-03-03 NOTE — PROGRESS NOTES
Fina Sigala (:  1955) is a 69 y.o. female, Established patient, here for evaluation of the following chief complaint(s):  Hypertension (6-month follow-up), Hyperlipidemia, Diabetes, and Hypothyroidism            Subjective   History of Present Illness  The patient presents for a routine checkup.    She is currently on Ozempic injections, which she started at the end of 2024. She reports a weight loss of 20 pounds and an overall improvement in her well-being. She has been advised to discontinue metformin and continue with Ozempic for both diabetes management and weight loss. She has a scheduled appointment with Dr. Carranza on 2025.    She has a bicuspid aortic valve and is scheduled for open heart surgery. She had an echocardiogram in 2025, which showed no significant changes. She is not experiencing any shortness of breath. She has a follow-up appointment with her cardiologist in May 2025.    She has been advised to undergo knee surgery due to mobility limitations caused by her knee condition. She is not experiencing any pain. She plans to have the surgery in August or 2025.    She is on Synthroid for her thyroid condition. Her insurance company initially refused to cover the cost of Synthroid, suggesting a cheaper alternative. However, after she contacted them, they approved Synthroid for another year.    She has not yet undergone a bone density test but believes her bone health is satisfactory. She had a mammogram at [facility].    FAMILY HISTORY  Her brother passed away from complications related to alcoholism.    MEDICATIONS  Current: Lipitor, Avalide, Ozempic, Synthroid  Discontinued: Metformin        Review of Systems   Constitutional:  Negative for activity change, appetite change, chills, diaphoresis, fatigue, fever and unexpected weight change.   HENT:  Negative for hearing loss, sinus pressure, sore throat, tinnitus, trouble swallowing and voice change.

## 2025-03-04 ASSESSMENT — ENCOUNTER SYMPTOMS
SINUS PRESSURE: 0
CHEST TIGHTNESS: 0
COUGH: 0
CONSTIPATION: 0
BLOOD IN STOOL: 0
SORE THROAT: 0
NAUSEA: 0
DIARRHEA: 0
BACK PAIN: 0
VOICE CHANGE: 0
TROUBLE SWALLOWING: 0
SHORTNESS OF BREATH: 0
WHEEZING: 0
VOMITING: 0
ABDOMINAL PAIN: 0
PHOTOPHOBIA: 0

## 2025-03-21 ENCOUNTER — OFFICE VISIT (OUTPATIENT)
Dept: BARIATRICS/WEIGHT MGMT | Age: 70
End: 2025-03-21
Payer: MEDICARE

## 2025-03-21 VITALS
BODY MASS INDEX: 43.06 KG/M2 | SYSTOLIC BLOOD PRESSURE: 122 MMHG | HEIGHT: 62 IN | DIASTOLIC BLOOD PRESSURE: 58 MMHG | WEIGHT: 234 LBS | TEMPERATURE: 97.2 F | HEART RATE: 85 BPM

## 2025-03-21 DIAGNOSIS — I10 PRIMARY HYPERTENSION: Primary | ICD-10-CM

## 2025-03-21 DIAGNOSIS — E66.01 CLASS 3 SEVERE OBESITY DUE TO EXCESS CALORIES WITHOUT SERIOUS COMORBIDITY WITH BODY MASS INDEX (BMI) OF 45.0 TO 49.9 IN ADULT: ICD-10-CM

## 2025-03-21 DIAGNOSIS — E66.813 CLASS 3 SEVERE OBESITY DUE TO EXCESS CALORIES WITHOUT SERIOUS COMORBIDITY WITH BODY MASS INDEX (BMI) OF 45.0 TO 49.9 IN ADULT: ICD-10-CM

## 2025-03-21 PROCEDURE — 1036F TOBACCO NON-USER: CPT | Performed by: INTERNAL MEDICINE

## 2025-03-21 PROCEDURE — 3074F SYST BP LT 130 MM HG: CPT | Performed by: INTERNAL MEDICINE

## 2025-03-21 PROCEDURE — 3017F COLORECTAL CA SCREEN DOC REV: CPT | Performed by: INTERNAL MEDICINE

## 2025-03-21 PROCEDURE — 1090F PRES/ABSN URINE INCON ASSESS: CPT | Performed by: INTERNAL MEDICINE

## 2025-03-21 PROCEDURE — 99214 OFFICE O/P EST MOD 30 MIN: CPT | Performed by: INTERNAL MEDICINE

## 2025-03-21 PROCEDURE — 99211 OFF/OP EST MAY X REQ PHY/QHP: CPT | Performed by: INTERNAL MEDICINE

## 2025-03-21 PROCEDURE — 3078F DIAST BP <80 MM HG: CPT | Performed by: INTERNAL MEDICINE

## 2025-03-21 PROCEDURE — G8417 CALC BMI ABV UP PARAM F/U: HCPCS | Performed by: INTERNAL MEDICINE

## 2025-03-21 PROCEDURE — 1123F ACP DISCUSS/DSCN MKR DOCD: CPT | Performed by: INTERNAL MEDICINE

## 2025-03-21 PROCEDURE — G8400 PT W/DXA NO RESULTS DOC: HCPCS | Performed by: INTERNAL MEDICINE

## 2025-03-21 PROCEDURE — G8428 CUR MEDS NOT DOCUMENT: HCPCS | Performed by: INTERNAL MEDICINE

## 2025-03-21 NOTE — PROGRESS NOTES
CC -   HTN, Obesity    BACKGROUND -   Last visit: 10/22/24  First visit: 10/22/24    Obesity   Began in late 30's  Initial BMI 46.5, Wt 254.2 lbs, Ht 5' 2\"  HS Grad wt 140 lbs   Lowest   wt 120 lbs   Highest  wt 254 lbs  Pattern of wt gain: grad  Wt change past yr: up 2 lbs  Most wt lost: 20 lbs (Dr. Tomas)  Other diets attempted: WW    Desire to lose weight: 10/10  Problem posed by appetite: 7/10    Initial Diet:    Number of meals per day - 2    Number of snacks per day - 1    Meal volume - 7\" plate, sometimes seconds    Fast food/convenience store - 1x/week    Restaurants (not fast food) - 2x/week   Sweets - 7d/week (1-2 biscotti's (110 Berto each) 5d/wk, one baked dessert (150 Berto))   Chips - 0d/week   Crackers/pretzels - 2-3d/week (one handful pretzels as L)   Nuts - 2-3d/week (one handful with pretzels for L)   Peanut Butter - 0d/week   Popcorn - 0-1d/week   Dried fruit - 7d/week (2 prunes (only for two months), also 1 large pinch raisins 1d))   Whole fruit - 1d/week (1 serving)   Breakfast cereal - 0d/week   Granola/Fruit/Energy bar - 0d/week   Sugar sweetened beverages - 2 cups coffee/d w/ 8 Tbsp Coffee Mate zero sugar Italian sweet cream (15 Berto each), 10 oz 2% milk/d   Protein - No supplements   Fiber - No supplements     Exercise:    Gym membership - [facility]    Walking - none    Running - none    Resistance - none    Aerobic (water) - 2 hrs 1d/wk + 1 hr 2d/wk  ______________________      PFSH -  Past Medical History:   Diagnosis Date    Aortic stenosis     HLD (hyperlipidemia)     Hypertension     Hypothyroidism     Obesity      Current Outpatient Medications   Medication Sig Dispense Refill    SYNTHROID 75 MCG tablet TAKE 1 TABLET BY MOUTH EVERY DAY 90 tablet 1    Blood Glucose Monitoring Suppl (ONE TOUCH ULTRA 2) w/Device KIT USE TO CHECK BLOOD GLUCOSE DAILY 1 kit 0    ONE TOUCH ULTRASOFT LANCETS MISC 1 each by Does not apply route daily 100 each 3    blood glucose test strips (ONETOUCH

## 2025-03-24 DIAGNOSIS — I10 ESSENTIAL HYPERTENSION: ICD-10-CM

## 2025-03-24 RX ORDER — IRBESARTAN AND HYDROCHLOROTHIAZIDE 150; 12.5 MG/1; MG/1
1 TABLET, FILM COATED ORAL DAILY
Qty: 90 TABLET | Refills: 1 | Status: SHIPPED | OUTPATIENT
Start: 2025-03-24

## 2025-03-24 NOTE — TELEPHONE ENCOUNTER
Name of Medication(s) Requested:  Requested Prescriptions     Pending Prescriptions Disp Refills    irbesartan-hydroCHLOROthiazide (AVALIDE) 150-12.5 MG per tablet [Pharmacy Med Name: IRBESARTAN-HCTZ 150-12.5 MG TB] 90 tablet 1     Sig: TAKE 1 TABLET BY MOUTH EVERY DAY       Medication is on current medication list Yes    Dosage and directions were verified? Yes    Quantity verified: 90 day supply     Pharmacy Verified?  Yes    Last Appointment:  3/3/2025    Future appts:  Future Appointments   Date Time Provider Department Center   5/23/2025 12:30 PM Luis Manuel Carranza MD Surg Weight [facility]   10/8/2025  9:00 AM [facility]        (If no appt send self scheduling link. .REFILLAPPT)  Scheduling request sent?     [] Yes  [x] No    Does patient need updated?  [] Yes  [x] No

## 2025-04-01 NOTE — PATIENT INSTRUCTIONS
Rules:  Count calories every day    Requirements:  Make sure protein intake is at least 70 grams per day (do not count protein every day; instead spot check your intake every 2-3 weeks and make sure what you think you are getting is close to accurate; consider using a protein shake if needed; these are in the pharmacy section of the stores, not the grocery section; Premier, Pure Protein and Fairlife are relatively inexpensive and taste good to most patients; other options are Nectar, Boost Max, Ensure Max, BeneProtein and GNC lean (which is lactose-free);   Nectar fruit, Premier Protein Clear, IsoPure Protein Drink, and Protein 2 O are water-based options; Quest (or Cosco, which is cheaper and is ordered on Amazon) and the [employer] protein bars can also be used, but have less protein in them )  (Disclaimer: Dietary supplements rarely have their listed ingredients and the amount of each verified by a third party other. Sometimes they give verification for their claims to be GMO and gluten free and to be organic. However, even such verifications as these may still be untrustworthy.)  Make sure fiber intake is at least 22 grams/day. Do this in part or whole by taking 12 tablespoons of [employer]'s Fiber One original, plain cereal (or [place]'s All Bran Buds cereal) or 4 tablespoons of wheat dextrin powder (Benefiber or generic brand). For both of these, start with 1/8th - 1/4th the target amount and every week add another 1/8th - 1/4th until reaching the target).  Also, fiber gummies containing inulin (such as Nature Made, Tobias, Benefiber) or Fiber Choice Pre-biotic tablets containing inulin are options. 1 cup of beans (not green beans, barbeque beans, baked beans or pork and beans) or peas is an excellent food source of fiber. Low calorie, high fiber bread products containing modified wheat starch can be used. An example is the [employer] Xtreme Wellness High Fiber Carb Lean tortilla (7grams in 30

## 2025-04-06 DIAGNOSIS — I10 ESSENTIAL HYPERTENSION: ICD-10-CM

## 2025-04-07 RX ORDER — ATORVASTATIN CALCIUM 20 MG/1
20 TABLET, FILM COATED ORAL DAILY
Qty: 90 TABLET | Refills: 1 | Status: SHIPPED | OUTPATIENT
Start: 2025-04-07

## 2025-04-07 NOTE — TELEPHONE ENCOUNTER
Name of Medication(s) Requested:  Requested Prescriptions     Pending Prescriptions Disp Refills    atorvastatin (LIPITOR) 20 MG tablet [Pharmacy Med Name: ATORVASTATIN 20 MG TABLET] 90 tablet 1     Sig: TAKE 1 TABLET BY MOUTH EVERY DAY       Medication is on current medication list Yes    Dosage and directions were verified? Yes    Quantity verified: 90 day supply     Pharmacy Verified?  Yes    Last Appointment:  3/3/2025    Future appts:  Future Appointments   Date Time Provider Department Center   7/23/2025  2:15 PM Luis Manuel Carranza MD Surg Weight [facility]   10/8/2025  9:00 AM Brii Monroe DO [place] [facility] DEP        (If no appt send self scheduling link. .REFILLAPPT)  Scheduling request sent?     [] Yes  [] No    Does patient need updated?  [] Yes  [x] No

## 2025-06-10 NOTE — TELEPHONE ENCOUNTER
Name of Medication(s) Requested:  Requested Prescriptions     Pending Prescriptions Disp Refills    SYNTHROID 75 MCG tablet [Pharmacy Med Name: SYNTHROID 75 MCG TABLET] 90 tablet 1     Sig: TAKE 1 TABLET BY MOUTH EVERY DAY       Medication is on current medication list Yes    Dosage and directions were verified? Yes    Quantity verified: 90 day supply     Pharmacy Verified?  Yes    Last Appointment:  3/3/2025    Future appts:  Future Appointments   Date Time Provider Department Center   7/23/2025  2:15 PM Luis Manuel Carranza MD Surg Weight [facility]   10/8/2025  9:00 AM Brii Monroe DO [place] [facility] DEP        (If no appt send self scheduling link. .REFILLAPPT)  Scheduling request sent?     [] Yes  [x] No    Does patient need updated?  [] Yes  [x] No

## 2025-06-12 ENCOUNTER — TELEPHONE (OUTPATIENT)
Dept: FAMILY MEDICINE CLINIC | Age: 70
End: 2025-06-12

## 2025-06-12 RX ORDER — LEVOTHYROXINE SODIUM 75 MCG
75 TABLET ORAL DAILY
Qty: 90 TABLET | Refills: 1 | Status: SHIPPED | OUTPATIENT
Start: 2025-06-12

## 2025-06-13 RX ORDER — LEVOTHYROXINE SODIUM 75 MCG
75 TABLET ORAL DAILY
Qty: 90 TABLET | Refills: 1 | Status: SHIPPED | OUTPATIENT
Start: 2025-06-13

## 2025-06-17 DIAGNOSIS — I10 ESSENTIAL HYPERTENSION: ICD-10-CM

## 2025-06-17 RX ORDER — IRBESARTAN AND HYDROCHLOROTHIAZIDE 150; 12.5 MG/1; MG/1
1 TABLET, FILM COATED ORAL DAILY
Qty: 90 TABLET | Refills: 1 | Status: SHIPPED | OUTPATIENT
Start: 2025-06-17

## 2025-06-17 NOTE — TELEPHONE ENCOUNTER
Name of Medication(s) Requested:  Requested Prescriptions     Pending Prescriptions Disp Refills    irbesartan-hydroCHLOROthiazide (AVALIDE) 150-12.5 MG per tablet 90 tablet 1     Sig: Take 1 tablet by mouth daily       Medication is on current medication list Yes    Dosage and directions were verified? Yes    Quantity verified: 90 day supply     Pharmacy Verified?  Yes    Last Appointment:  3/3/2025    Future appts:  Future Appointments   Date Time Provider Department Center   7/23/2025  2:15 PM Luis Manuel Carranza MD Surg Weight [facility]   10/8/2025  9:00 AM Brii Monroe DO [place] [facility] DEP        (If no appt send self scheduling link. .REFILLAPPT)  Scheduling request sent?     [] Yes  [x] No    Does patient need updated?  [] Yes  [x] No

## 2025-07-06 DIAGNOSIS — E11.9 TYPE 2 DIABETES MELLITUS WITHOUT COMPLICATION, WITHOUT LONG-TERM CURRENT USE OF INSULIN (HCC): ICD-10-CM

## 2025-07-07 RX ORDER — SEMAGLUTIDE 0.68 MG/ML
INJECTION, SOLUTION SUBCUTANEOUS
Qty: 6 ML | Refills: 3 | OUTPATIENT
Start: 2025-07-07

## 2025-07-08 RX ORDER — SEMAGLUTIDE 0.68 MG/ML
INJECTION, SOLUTION SUBCUTANEOUS
Qty: 3 ML | Refills: 5 | Status: SHIPPED | OUTPATIENT
Start: 2025-07-08

## 2025-07-23 ENCOUNTER — OFFICE VISIT (OUTPATIENT)
Age: 70
End: 2025-07-23
Payer: MEDICARE

## 2025-07-23 VITALS
BODY MASS INDEX: 42.14 KG/M2 | TEMPERATURE: 98.2 F | HEART RATE: 85 BPM | HEIGHT: 62 IN | WEIGHT: 229 LBS | SYSTOLIC BLOOD PRESSURE: 125 MMHG | DIASTOLIC BLOOD PRESSURE: 53 MMHG

## 2025-07-23 DIAGNOSIS — E11.9 TYPE 2 DIABETES MELLITUS WITHOUT COMPLICATION, WITHOUT LONG-TERM CURRENT USE OF INSULIN (HCC): ICD-10-CM

## 2025-07-23 DIAGNOSIS — E66.813 CLASS 3 SEVERE OBESITY DUE TO EXCESS CALORIES WITHOUT SERIOUS COMORBIDITY WITH BODY MASS INDEX (BMI) OF 45.0 TO 49.9 IN ADULT (HCC): ICD-10-CM

## 2025-07-23 DIAGNOSIS — I10 PRIMARY HYPERTENSION: Primary | ICD-10-CM

## 2025-07-23 PROCEDURE — 3074F SYST BP LT 130 MM HG: CPT | Performed by: INTERNAL MEDICINE

## 2025-07-23 PROCEDURE — 3078F DIAST BP <80 MM HG: CPT | Performed by: INTERNAL MEDICINE

## 2025-07-23 PROCEDURE — 99214 OFFICE O/P EST MOD 30 MIN: CPT | Performed by: INTERNAL MEDICINE

## 2025-07-23 PROCEDURE — 1090F PRES/ABSN URINE INCON ASSESS: CPT | Performed by: INTERNAL MEDICINE

## 2025-07-23 PROCEDURE — 1036F TOBACCO NON-USER: CPT | Performed by: INTERNAL MEDICINE

## 2025-07-23 PROCEDURE — 99213 OFFICE O/P EST LOW 20 MIN: CPT | Performed by: INTERNAL MEDICINE

## 2025-07-23 PROCEDURE — G8428 CUR MEDS NOT DOCUMENT: HCPCS | Performed by: INTERNAL MEDICINE

## 2025-07-23 PROCEDURE — G8400 PT W/DXA NO RESULTS DOC: HCPCS | Performed by: INTERNAL MEDICINE

## 2025-07-23 PROCEDURE — 3017F COLORECTAL CA SCREEN DOC REV: CPT | Performed by: INTERNAL MEDICINE

## 2025-07-23 PROCEDURE — 2022F DILAT RTA XM EVC RTNOPTHY: CPT | Performed by: INTERNAL MEDICINE

## 2025-07-23 PROCEDURE — 3044F HG A1C LEVEL LT 7.0%: CPT | Performed by: INTERNAL MEDICINE

## 2025-07-23 PROCEDURE — G8417 CALC BMI ABV UP PARAM F/U: HCPCS | Performed by: INTERNAL MEDICINE

## 2025-07-23 PROCEDURE — 1123F ACP DISCUSS/DSCN MKR DOCD: CPT | Performed by: INTERNAL MEDICINE

## 2025-07-23 NOTE — PROGRESS NOTES
CC -   HTN, Obesity    BACKGROUND -   Last visit: 03/21/25  First visit: 10/22/24    Obesity   Began in late 30's  Initial BMI 46.5, Wt 254.2 lbs, Ht 5' 2\"  HS Grad wt 140 lbs   Lowest   wt 120 lbs   Highest  wt 254 lbs  Pattern of wt gain: grad  Wt change past yr: up 2 lbs  Most wt lost: 20 lbs (Dr. Tomas)  Other diets attempted: WW    Desire to lose weight: 10/10  Problem posed by appetite: 7/10    Initial Diet:    Number of meals per day - 2    Number of snacks per day - 1    Meal volume - 7\" plate, sometimes seconds    Fast food/convenience store - 1x/week    Restaurants (not fast food) - 2x/week   Sweets - 7d/week (1-2 biscotti's (110 Berto each) 5d/wk, one baked dessert (150 Berto))   Chips - 0d/week   Crackers/pretzels - 2-3d/week (one handful pretzels as L)   Nuts - 2-3d/week (one handful with pretzels for L)   Peanut Butter - 0d/week   Popcorn - 0-1d/week   Dried fruit - 7d/week (2 prunes (only for two months), also 1 large pinch raisins 1d))   Whole fruit - 1d/week (1 serving)   Breakfast cereal - 0d/week   Granola/Fruit/Energy bar - 0d/week   Sugar sweetened beverages - 2 cups coffee/d w/ 8 Tbsp Coffee Mate zero sugar Italian sweet cream (15 Berto each), 10 oz 2% milk/d   Protein - No supplements   Fiber - No supplements     Exercise:    Gym membership - [facility]    Walking - none    Running - none    Resistance - none    Aerobic (water) - 2 hrs 1d/wk + 1 hr 2d/wk  ______________________      PFSH -  Past Medical History:   Diagnosis Date    Aortic stenosis     HLD (hyperlipidemia)     Hypertension     Hypothyroidism     Obesity      Current Outpatient Medications   Medication Sig Dispense Refill    Semaglutide,0.25 or 0.5MG/DOS, (OZEMPIC, 0.25 OR 0.5 MG/DOSE,) 2 MG/3ML SOPN INJECT 0.5 MG UNDER THE SKIN ONCE EVERY 7 DAYS 3 mL 5    irbesartan-hydroCHLOROthiazide (AVALIDE) 150-12.5 MG per tablet Take 1 tablet by mouth daily 90 tablet 1    SYNTHROID 75 MCG tablet Take 1 tablet by mouth daily 90

## 2025-07-23 NOTE — PATIENT INSTRUCTIONS
Rules:  Count calories every day    Requirements:  Make sure protein intake is at least 70 grams per day (do not count protein every day; instead spot check your intake every 2-3 weeks and make sure what you think you are getting is close to accurate; consider using a protein shake if needed; these are in the pharmacy section of the stores, not the grocery section; Premier, Pure Protein and Fairlife are relatively inexpensive and taste good to most patients; other options are Nectar, Boost Max, Ensure Max, BeneProtein and GNC lean (which is lactose-free);   Nectar fruit, Premier Protein Clear, IsoPure Protein Drink, and Protein 2 O are water-based options; Quest (or Cosco, which is cheaper and is ordered on Amazon) and the [employer] protein bars can also be used, but have less protein in them )  (Disclaimer: Dietary supplements rarely have their listed ingredients and the amount of each verified by a third party other. Sometimes they give verification for their claims to be GMO and gluten free and to be organic. However, even such verifications as these may still be untrustworthy.)  Make sure fiber intake is at least 22 grams/day. Do this in part or whole by taking 12 tablespoons of [employer]'s Fiber One original, plain cereal (or [place]'s All Bran Buds cereal) or 4 tablespoons of wheat dextrin powder (Benefiber or generic brand). For both of these, start with 1/8th - 1/4th the target amount and every week add another 1/8th - 1/4th until reaching the target).  Also, fiber gummies containing inulin (such as Nature Made, Tobias, Benefiber) or Fiber Choice Pre-biotic tablets containing inulin are options. 1 cup of beans (not green beans, barbeque beans, baked beans or pork and beans) or peas is an excellent food source of fiber. Low calorie, high fiber bread products containing modified wheat starch can be used. An example is the [employer] Xtreme Wellness High Fiber Carb Lean tortilla (7grams in 30

## 2025-08-06 ENCOUNTER — TELEPHONE (OUTPATIENT)
Dept: FAMILY MEDICINE CLINIC | Age: 70
End: 2025-08-06